# Patient Record
Sex: MALE | Race: WHITE | NOT HISPANIC OR LATINO | URBAN - METROPOLITAN AREA
[De-identification: names, ages, dates, MRNs, and addresses within clinical notes are randomized per-mention and may not be internally consistent; named-entity substitution may affect disease eponyms.]

---

## 2020-08-26 ENCOUNTER — APPOINTMENT (RX ONLY)
Dept: URBAN - METROPOLITAN AREA CLINIC 374 | Facility: CLINIC | Age: 40
Setting detail: DERMATOLOGY
End: 2020-08-26

## 2020-08-26 DIAGNOSIS — D22 MELANOCYTIC NEVI: ICD-10-CM

## 2020-08-26 DIAGNOSIS — Q828 OTHER SPECIFIED ANOMALIES OF SKIN: ICD-10-CM

## 2020-08-26 DIAGNOSIS — Q826 OTHER SPECIFIED ANOMALIES OF SKIN: ICD-10-CM

## 2020-08-26 DIAGNOSIS — L91.8 OTHER HYPERTROPHIC DISORDERS OF THE SKIN: ICD-10-CM

## 2020-08-26 DIAGNOSIS — Q819 OTHER SPECIFIED ANOMALIES OF SKIN: ICD-10-CM

## 2020-08-26 PROBLEM — D48.5 NEOPLASM OF UNCERTAIN BEHAVIOR OF SKIN: Status: ACTIVE | Noted: 2020-08-26

## 2020-08-26 PROBLEM — D22.4 MELANOCYTIC NEVI OF SCALP AND NECK: Status: ACTIVE | Noted: 2020-08-26

## 2020-08-26 PROBLEM — L85.8 OTHER SPECIFIED EPIDERMAL THICKENING: Status: ACTIVE | Noted: 2020-08-26

## 2020-08-26 PROCEDURE — ? BIOPSY BY SHAVE METHOD

## 2020-08-26 PROCEDURE — ? PHOTO-DOCUMENTATION

## 2020-08-26 PROCEDURE — ? COUNSELING

## 2020-08-26 PROCEDURE — 17110 DESTRUCTION B9 LES UP TO 14: CPT

## 2020-08-26 PROCEDURE — 11102 TANGNTL BX SKIN SINGLE LES: CPT | Mod: 59

## 2020-08-26 PROCEDURE — 99202 OFFICE O/P NEW SF 15 MIN: CPT | Mod: 25

## 2020-08-26 PROCEDURE — ? BENIGN DESTRUCTION

## 2020-08-26 PROCEDURE — ? PRESCRIPTION SAMPLES PROVIDED

## 2020-08-26 ASSESSMENT — LOCATION SIMPLE DESCRIPTION DERM
LOCATION SIMPLE: LEFT UPPER ARM
LOCATION SIMPLE: RIGHT ANTERIOR NECK
LOCATION SIMPLE: LEFT FOREARM
LOCATION SIMPLE: LEFT MEDIAL SUPERIOR TARSAL REGION
LOCATION SIMPLE: RIGHT UPPER ARM
LOCATION SIMPLE: RIGHT FOREARM

## 2020-08-26 ASSESSMENT — LOCATION DETAILED DESCRIPTION DERM
LOCATION DETAILED: RIGHT PROXIMAL POSTERIOR UPPER ARM
LOCATION DETAILED: LEFT DISTAL DORSAL FOREARM
LOCATION DETAILED: RIGHT PROXIMAL DORSAL FOREARM
LOCATION DETAILED: LEFT DISTAL POSTERIOR UPPER ARM
LOCATION DETAILED: RIGHT DISTAL DORSAL FOREARM
LOCATION DETAILED: LEFT PROXIMAL POSTERIOR UPPER ARM
LOCATION DETAILED: LEFT MEDIAL SUPERIOR TARSAL REGION
LOCATION DETAILED: LEFT PROXIMAL DORSAL FOREARM
LOCATION DETAILED: RIGHT DISTAL POSTERIOR UPPER ARM
LOCATION DETAILED: RIGHT CLAVICULAR NECK

## 2020-08-26 ASSESSMENT — LOCATION ZONE DERM
LOCATION ZONE: NECK
LOCATION ZONE: ARM
LOCATION ZONE: EYELID

## 2020-08-26 ASSESSMENT — SEVERITY ASSESSMENT: SEVERITY: MILD TO MODERATE

## 2020-08-26 NOTE — PROCEDURE: BENIGN DESTRUCTION
Medical Necessity Clause: This procedure was medically necessary because the lesions that were treated were:
Add 52 Modifier (Optional): no
Consent: The patient's consent was obtained including but not limited to risks of crusting, scabbing, blistering, scarring, darker or lighter pigmentary change, recurrence, incomplete removal and infection.
Post-Care Instructions: I reviewed with the patient in detail post-care instructions. Patient is to wear sunprotection, and avoid picking at any of the treated lesions. Pt may apply Vaseline to crusted or scabbing areas.
Medical Necessity Information: It is in your best interest to select a reason for this procedure from the list below. All of these items fulfill various CMS LCD requirements except the new and changing color options.
Anesthesia Volume In Cc: 0.5
Treatment Number (Will Not Render If 0): 1
Detail Level: Detailed

## 2020-08-26 NOTE — PROCEDURE: BIOPSY BY SHAVE METHOD

## 2020-09-23 ENCOUNTER — APPOINTMENT (RX ONLY)
Dept: URBAN - METROPOLITAN AREA CLINIC 374 | Facility: CLINIC | Age: 40
Setting detail: DERMATOLOGY
End: 2020-09-23

## 2020-09-23 DIAGNOSIS — Q819 OTHER SPECIFIED ANOMALIES OF SKIN: ICD-10-CM

## 2020-09-23 DIAGNOSIS — Q828 OTHER SPECIFIED ANOMALIES OF SKIN: ICD-10-CM

## 2020-09-23 DIAGNOSIS — Q826 OTHER SPECIFIED ANOMALIES OF SKIN: ICD-10-CM

## 2020-09-23 PROBLEM — D23.39 OTHER BENIGN NEOPLASM OF SKIN OF OTHER PARTS OF FACE: Status: ACTIVE | Noted: 2020-09-23

## 2020-09-23 PROBLEM — L85.8 OTHER SPECIFIED EPIDERMAL THICKENING: Status: ACTIVE | Noted: 2020-09-23

## 2020-09-23 PROCEDURE — ? PRESCRIPTION SAMPLES PROVIDED

## 2020-09-23 PROCEDURE — ? PATHOLOGY DISCUSSION

## 2020-09-23 PROCEDURE — 99213 OFFICE O/P EST LOW 20 MIN: CPT

## 2020-09-23 PROCEDURE — ? COUNSELING

## 2020-09-23 PROCEDURE — ? PRESCRIPTION MEDICATION MANAGEMENT

## 2020-09-23 PROCEDURE — ? PHOTO-DOCUMENTATION

## 2020-09-23 ASSESSMENT — LOCATION DETAILED DESCRIPTION DERM
LOCATION DETAILED: LEFT PROXIMAL POSTERIOR UPPER ARM
LOCATION DETAILED: RIGHT DISTAL POSTERIOR UPPER ARM
LOCATION DETAILED: LEFT PROXIMAL DORSAL FOREARM
LOCATION DETAILED: LEFT DISTAL POSTERIOR UPPER ARM
LOCATION DETAILED: RIGHT PROXIMAL DORSAL FOREARM
LOCATION DETAILED: RIGHT PROXIMAL POSTERIOR UPPER ARM
LOCATION DETAILED: RIGHT DISTAL DORSAL FOREARM
LOCATION DETAILED: LEFT DISTAL DORSAL FOREARM

## 2020-09-23 ASSESSMENT — LOCATION SIMPLE DESCRIPTION DERM
LOCATION SIMPLE: RIGHT FOREARM
LOCATION SIMPLE: LEFT UPPER ARM
LOCATION SIMPLE: RIGHT UPPER ARM
LOCATION SIMPLE: LEFT FOREARM

## 2020-09-23 ASSESSMENT — LOCATION ZONE DERM: LOCATION ZONE: ARM

## 2021-08-04 ENCOUNTER — HOSPITAL ENCOUNTER (EMERGENCY)
Facility: HOSPITAL | Age: 41
Discharge: HOME | End: 2021-08-04
Attending: EMERGENCY MEDICINE
Payer: COMMERCIAL

## 2021-08-04 VITALS
HEART RATE: 91 BPM | TEMPERATURE: 98.4 F | RESPIRATION RATE: 16 BRPM | DIASTOLIC BLOOD PRESSURE: 92 MMHG | SYSTOLIC BLOOD PRESSURE: 166 MMHG | OXYGEN SATURATION: 94 %

## 2021-08-04 DIAGNOSIS — L73.9 FOLLICULITIS: Primary | ICD-10-CM

## 2021-08-04 PROCEDURE — 63700000 HC SELF-ADMINISTRABLE DRUG: Performed by: PHYSICIAN ASSISTANT

## 2021-08-04 PROCEDURE — 99283 EMERGENCY DEPT VISIT LOW MDM: CPT

## 2021-08-04 RX ORDER — PERPHENAZINE 8 MG/1
8 TABLET ORAL
COMMUNITY
Start: 2021-07-14 | End: 2021-12-29

## 2021-08-04 RX ORDER — ATORVASTATIN CALCIUM 10 MG/1
10 TABLET, FILM COATED ORAL
COMMUNITY
Start: 2021-07-03 | End: 2021-12-29 | Stop reason: SDUPTHER

## 2021-08-04 RX ORDER — PERPHENAZINE 2 MG/1
2 TABLET ORAL
COMMUNITY
Start: 2021-05-31 | End: 2021-12-29

## 2021-08-04 RX ORDER — ESCITALOPRAM OXALATE 20 MG/1
15 TABLET ORAL DAILY
COMMUNITY
Start: 2021-05-03 | End: 2022-12-13 | Stop reason: ALTCHOICE

## 2021-08-04 RX ORDER — ARIPIPRAZOLE 30 MG/1
30 TABLET ORAL
COMMUNITY
Start: 2021-06-14 | End: 2021-12-29

## 2021-08-04 RX ORDER — SULFAMETHOXAZOLE AND TRIMETHOPRIM 800; 160 MG/1; MG/1
1 TABLET ORAL 2 TIMES DAILY
Qty: 14 TABLET | Refills: 0 | Status: SHIPPED | OUTPATIENT
Start: 2021-08-04 | End: 2021-08-11

## 2021-08-04 RX ORDER — PROPRANOLOL HYDROCHLORIDE 10 MG/1
10 TABLET ORAL
COMMUNITY
Start: 2021-07-03 | End: 2021-12-29 | Stop reason: SDUPTHER

## 2021-08-04 RX ORDER — PERPHENAZINE 4 MG/1
4 TABLET ORAL
COMMUNITY
Start: 2021-07-13 | End: 2021-12-29

## 2021-08-04 RX ORDER — SULFAMETHOXAZOLE AND TRIMETHOPRIM 800; 160 MG/1; MG/1
1 TABLET ORAL EVERY 12 HOURS
Status: DISCONTINUED | OUTPATIENT
Start: 2021-08-04 | End: 2021-08-05 | Stop reason: HOSPADM

## 2021-08-04 RX ADMIN — SULFAMETHOXAZOLE AND TRIMETHOPRIM 1 TABLET: 800; 160 TABLET ORAL at 22:48

## 2021-08-05 ASSESSMENT — ENCOUNTER SYMPTOMS
VOMITING: 0
CHILLS: 0
NAUSEA: 0
COLOR CHANGE: 1
DIAPHORESIS: 0
FEVER: 0

## 2021-08-05 NOTE — ED PROVIDER NOTES
Emergency Medicine Note  HPI   HISTORY OF PRESENT ILLNESS   41yM with PMH of MRSA, HTN,  shizophrenia, depression, presents to ED for evaluation due to concern for abscess/infected hair follicle on mons pubis. Pt reports over the last few days he has had increased redness and pain to the area, prompting him to come to ED for evaluation.    History provided by:  Patient   used: No    Abscess  Location:  Pelvis  Ano-genital abscess location: mons pubis.  Size:  3cm  Abscess quality: painful and redness    Abscess quality: not draining    Red streaking: no    Progression:  Worsening  Pain details:     Quality:  Aching    Severity:  Moderate    Timing:  Constant    Progression:  Worsening  Context: not injected drug use and not skin injury    Relieved by:  None tried  Worsened by:  Nothing  Associated symptoms: no fever, no nausea and no vomiting    Risk factors: hx of MRSA    Risk factors: no prior abscess          Patient History   PAST HISTORY     Reviewed from Nursing Triage:      Past Medical History:   Diagnosis Date   • Depression    • Hypertension    • Schizo affective schizophrenia (CMS/HCC)        History reviewed. No pertinent surgical history.    History reviewed. No pertinent family history.    Social History     Tobacco Use   • Smoking status: Never Smoker   Substance Use Topics   • Alcohol use: Never   • Drug use: Never         Review of Systems   REVIEW OF SYSTEMS     Review of Systems   Constitutional: Negative for chills, diaphoresis and fever.   Gastrointestinal: Negative for nausea and vomiting.   Skin: Positive for color change.         VITALS     ED Vitals    Date/Time Temp Pulse Resp BP SpO2 [facility]   08/04/21 2110 36.9 °C (98.4 °F) 91 16 166/92 94 % [facility]        Pulse Ox %: 94 % (08/04/21 2154)  Pulse Ox Interpretation: Normal (08/04/21 2154)           Physical Exam   PHYSICAL EXAM     Physical Exam  Vitals and nursing note reviewed.   Constitutional:       General: He is not in  acute distress.     Appearance: He is well-developed.   Cardiovascular:      Rate and Rhythm: Normal rate and regular rhythm.      Heart sounds: Normal heart sounds. No murmur heard.   No friction rub. No gallop.    Pulmonary:      Effort: Pulmonary effort is normal. No respiratory distress.      Breath sounds: Normal breath sounds.   Chest:      Chest wall: No tenderness.   Abdominal:      General: Bowel sounds are normal. There is no distension.      Palpations: Abdomen is soft.      Tenderness: There is no abdominal tenderness. There is no guarding or rebound.               PROCEDURES     Procedures     DATA     Results     None          Imaging Results    None         No orders to display       Scoring tools                                 ED Course & MDM   MDM / ED COURSE and CLINICAL IMPRESSIONS     MDM    ED Course as of Aug 05 1221   Wed Aug 04, 2021   2154 Early abscess versus folliculitis to mons pubis. No fluctuance or drainable collection.  Given h/o MRSA will cover with Bactrim DS. To begin warm compresses.  D/c home to f/u with PCP. Discussed return precautions.  Pt agreeable to plan.    [KL]      ED Course User Index  [KL] Sayra Adams PA C         Clinical Impressions as of Aug 05 1221   Folliculitis     Disposition: Discharge           Sayra Adams PA C  08/05/21 1221

## 2021-08-05 NOTE — ED ATTESTATION NOTE
Physician Attestation:     I personally saw and evaluated the patient, participated in the management, and agree with the findings in the above note except as where stated.  The Physician Assistant and I discussed  the case, workup, and disposition.      Chief Complaint  Chief Complaint   Patient presents with   • Abscess     infected hair follicle above genitals       41-year-old male presents for evaluation of abscess to his suprapubic region.  Has been there between 2 and 4 days.  No fevers.  History of MRSA in the past.  No drainage.    Exam performed with physician assistant  Small 2 cm indurated abscess very slightly erythematous with a central head that is not draining.  There is no fluctuance. No crepitus     Plan: Warm compresses and Bactrim        Leonard Wilde,   08/04/21 [id number]

## 2021-08-05 NOTE — DISCHARGE INSTRUCTIONS
Apply warm compresses to area hourly, which may help promote draining.   Begin taking Bactrim twice daily for one week.    Please return to the ED if you develop signs or symptoms of infection including, increased redness or red streaks around the wound, fever, chills, or any other concerning symptoms.

## 2021-12-29 ENCOUNTER — OFFICE VISIT (OUTPATIENT)
Dept: FAMILY MEDICINE | Facility: CLINIC | Age: 41
End: 2021-12-29
Payer: COMMERCIAL

## 2021-12-29 VITALS
SYSTOLIC BLOOD PRESSURE: 116 MMHG | HEIGHT: 74 IN | HEART RATE: 86 BPM | RESPIRATION RATE: 16 BRPM | BODY MASS INDEX: 34.19 KG/M2 | TEMPERATURE: 97.5 F | WEIGHT: 266.4 LBS | OXYGEN SATURATION: 94 % | DIASTOLIC BLOOD PRESSURE: 70 MMHG

## 2021-12-29 DIAGNOSIS — Z11.59 NEED FOR HEPATITIS C SCREENING TEST: ICD-10-CM

## 2021-12-29 DIAGNOSIS — Z11.4 SCREENING FOR HIV (HUMAN IMMUNODEFICIENCY VIRUS): ICD-10-CM

## 2021-12-29 DIAGNOSIS — E66.9 OBESITY (BMI 30-39.9): ICD-10-CM

## 2021-12-29 DIAGNOSIS — R68.82 DECREASED SEX DRIVE: Primary | ICD-10-CM

## 2021-12-29 PROCEDURE — 3008F BODY MASS INDEX DOCD: CPT

## 2021-12-29 PROCEDURE — 99203 OFFICE O/P NEW LOW 30 MIN: CPT | Mod: GC

## 2021-12-29 RX ORDER — OLANZAPINE 5 MG/1
1 TABLET ORAL NIGHTLY
COMMUNITY
Start: 2021-12-18 | End: 2022-12-13 | Stop reason: ALTCHOICE

## 2021-12-29 RX ORDER — PROPRANOLOL HYDROCHLORIDE 10 MG/1
10 TABLET ORAL
Qty: 30 TABLET | Refills: 3 | Status: SHIPPED | OUTPATIENT
Start: 2021-12-29 | End: 2022-05-03 | Stop reason: SDUPTHER

## 2021-12-29 RX ORDER — ATORVASTATIN CALCIUM 10 MG/1
10 TABLET, FILM COATED ORAL
Qty: 30 TABLET | Refills: 3 | Status: SHIPPED | OUTPATIENT
Start: 2021-12-29 | End: 2022-05-03 | Stop reason: SDUPTHER

## 2021-12-29 ASSESSMENT — PATIENT HEALTH QUESTIONNAIRE - PHQ9: SUM OF ALL RESPONSES TO PHQ9 QUESTIONS 1 & 2: 4

## 2021-12-29 NOTE — PATIENT INSTRUCTIONS
1) Please get you blood work done. Please fast for 8 hrs before hand. You can drink water and take medications normally. I will call you with the results    2) Please call to make an appointment for sleep study    Patient Education     Sleep Apnea  Sleep apnea is a condition in which breathing pauses or becomes shallow during sleep. Episodes of sleep apnea usually last 10 seconds or longer, and they may occur as many as 20 times an hour. Sleep apnea disrupts your sleep and keeps your body from getting the rest that it needs. This condition can increase your risk of certain health problems, including:  · Heart attack.  · Stroke.  · Obesity.  · Diabetes.  · Heart failure.  · Irregular heartbeat.  What are the causes?  There are three kinds of sleep apnea:  · Obstructive sleep apnea. This kind is caused by a blocked or collapsed airway.  · Central sleep apnea. This kind happens when the part of the brain that controls breathing does not send the correct signals to the muscles that control breathing.  · Mixed sleep apnea. This is a combination of obstructive and central sleep apnea.  The most common cause of this condition is a collapsed or blocked airway. An airway can collapse or become blocked if:  · Your throat muscles are abnormally relaxed.  · Your tongue and tonsils are larger than normal.  · You are overweight.  · Your airway is smaller than normal.  What increases the risk?  You are more likely to develop this condition if you:  · Are overweight.  · Smoke.  · Have a smaller than normal airway.  · Are elderly.  · Are male.  · Drink alcohol.  · Take sedatives or tranquilizers.  · Have a family history of sleep apnea.  What are the signs or symptoms?  Symptoms of this condition include:  · Trouble staying asleep.  · Daytime sleepiness and tiredness.  · Irritability.  · Loud snoring.  · Morning headaches.  · Trouble concentrating.  · Forgetfulness.  · Decreased interest in sex.  · Unexplained sleepiness.  · Mood  swings.  · Personality changes.  · Feelings of depression.  · Waking up often during the night to urinate.  · Dry mouth.  · Sore throat.  How is this diagnosed?  This condition may be diagnosed with:  · A medical history.  · A physical exam.  · A series of tests that are done while you are sleeping (sleep study). These tests are usually done in a sleep lab, but they may also be done at home.  How is this treated?  Treatment for this condition aims to restore normal breathing and to ease symptoms during sleep. It may involve managing health issues that can affect breathing, such as high blood pressure or obesity. Treatment may include:  · Sleeping on your side.  · Using a decongestant if you have nasal congestion.  · Avoiding the use of depressants, including alcohol, sedatives, and narcotics.  · Losing weight if you are overweight.  · Making changes to your diet.  · Quitting smoking.  · Using a device to open your airway while you sleep, such as:  ? An oral appliance. This is a custom-made mouthpiece that shifts your lower jaw forward.  ? A continuous positive airway pressure (CPAP) device. This device blows air through a mask when you breathe out (exhale).  ? A nasal expiratory positive airway pressure (EPAP) device. This device has valves that you put into each nostril.  ? A bi-level positive airway pressure (BPAP) device. This device blows air through a mask when you breathe in (inhale) and breathe out (exhale).  · Having surgery if other treatments do not work. During surgery, excess tissue is removed to create a wider airway.  It is important to get treatment for sleep apnea. Without treatment, this condition can lead to:  · High blood pressure.  · Coronary artery disease.  · In men, an inability to achieve or maintain an erection (impotence).  · Reduced thinking abilities.  Follow these instructions at home:  Lifestyle  · Make any lifestyle changes that your health care provider recommends.  · Eat a healthy,  well-balanced diet.  · Take steps to lose weight if you are overweight.  · Avoid using depressants, including alcohol, sedatives, and narcotics.  · Do not use any products that contain nicotine or tobacco, such as cigarettes, e-cigarettes, and chewing tobacco. If you need help quitting, ask your health care provider.  General instructions  · Take over-the-counter and prescription medicines only as told by your health care provider.  · If you were given a device to open your airway while you sleep, use it only as told by your health care provider.  · If you are having surgery, make sure to tell your health care provider you have sleep apnea. You may need to bring your device with you.  · Keep all follow-up visits as told by your health care provider. This is important.  Contact a health care provider if:  · The device that you received to open your airway during sleep is uncomfortable or does not seem to be working.  · Your symptoms do not improve.  · Your symptoms get worse.  Get help right away if:  · You develop:  ? Chest pain.  ? Shortness of breath.  ? Discomfort in your back, arms, or stomach.  · You have:  ? Trouble speaking.  ? Weakness on one side of your body.  ? Drooping in your face.  These symptoms may represent a serious problem that is an emergency. Do not wait to see if the symptoms will go away. Get medical help right away. Call your local emergency services (911 in the [place] Do not drive yourself to the hospital.  Summary  · Sleep apnea is a condition in which breathing pauses or becomes shallow during sleep.  · The most common cause is a collapsed or blocked airway.  · The goal of treatment is to restore normal breathing and to ease symptoms during sleep.  This information is not intended to replace advice given to you by your health care provider. Make sure you discuss any questions you have with your health care provider.  Document Revised: 06/03/2020 Document Reviewed: 08/13/2019  Rosina  Patient Education © 2021 [employer]

## 2021-12-29 NOTE — ASSESSMENT & PLAN NOTE
BMI 34  Daytime fatigue low sex drive possibly 2/2 sleep apnea  MP score 4    - send for sleep study  - f/u fasting BMP, CBC, FLP, TSH

## 2021-12-29 NOTE — PROGRESS NOTES
Subjective      Patient ID: Rodolfo Stafford is a 41 y.o. male here for the following:   Low Testosterone    HPI  Takes propanolol for occasional palpitations. Used to see [facility] family medicine, Dr. Juan R Winkler, talkspace. 1 month ago. Recommended testosterone testing. Was on Vraylar which caused visceral pain and dec sex drive, stopped 2 weeks ago (took for 1 wk). Visceral pain has resolved. Sex drive is a little better    But has overall been decreased since starting psych medications in 2013.   Still has nocturnal erections, no hair loss, or difficulty growing facial hair    Feels tired/fatigued all the time. Works 30 hr/wk + standing. Has difficulty falling asleep and waking up in the work, late to work on occasion. Occassionally  Goes to bed 10pm-2am wakes at 8-9am. Has been told he snores    PHQ9: 4    The following have been reviewed and updated as appropriate in this visit:  Allergies  Meds  Problems  Social History  Last Labs    No Known Allergies  Current Outpatient Medications   Medication Sig Dispense Refill   • atorvastatin 10 mg tablet Take 1 tablet (10 mg total) by mouth once daily. 30 tablet 3   • escitalopram (LEXAPRO) 20 mg tablet TAKE ONE AND ONE HALF (1.5) TABLETS BY MOUTH EVERY MORNING     • OLANZapine 5 mg tablet Take 1 tablet by mouth nightly.     • propranoloL 10 mg tablet Take 1 tablet (10 mg total) by mouth once daily. 30 tablet 3   • ARIPiprazole (ABILIFY) 30 mg tablet Take 30 mg by mouth once daily.       No current facility-administered medications for this visit.     Past Medical History:   Diagnosis Date   • Anxiety    • Depression    • Schizophrenia (CMS/HCC)      Past Surgical History:   Procedure Laterality Date   • TONSILLECTOMY     • WISDOM TOOTH EXTRACTION  2012    All 4     Family History   Problem Relation Age of Onset   • Hyperlipidemia Biological Mother    • Stroke Biological Mother    • Depression Biological Mother    • Suicide Attempts Biological Mother    •  Asthma Biological Mother    • Arthritis Biological Mother      Social History     Socioeconomic History   • Marital status: Single     Spouse name: Not on file   • Number of children: Not on file   • Years of education: Not on file   • Highest education level: Not on file   Occupational History   • Occupation: - [employer]   Tobacco Use   • Smoking status: Never Smoker   • Smokeless tobacco: Never Used   Vaping Use   • Vaping Use: Never used   Substance and Sexual Activity   • Alcohol use: Never   • Drug use: Never   • Sexual activity: Not Currently     Partners: Female     Birth control/protection: Condom   Other Topics Concern   • Not on file   Social History Narrative   • Not on file     Social Determinants of Health     Financial Resource Strain: Not on file   Food Insecurity: Not on file   Transportation Needs: Not on file   Physical Activity: Not on file   Stress: Not on file   Social Connections: Not on file   Intimate Partner Violence: Not on file   Housing Stability: Not on file       Screenings  Hep C screening: ordered  HIV screen: ordered     Vaccines     Influenza: UTD    Tdap: UTD    COVID: UTD and boosted    Physical Exam  Vitals reviewed. Exam conducted with a chaperone present.   Constitutional:       General: He is not in acute distress.  HENT:      Right Ear: External ear normal.      Left Ear: External ear normal.      Mouth/Throat:      Mouth: Mucous membranes are moist. No oral lesions.      Tongue: No lesions.      Comments: MP 4  Eyes:      General: No scleral icterus.     Extraocular Movements: Extraocular movements intact.      Conjunctiva/sclera: Conjunctivae normal.      Pupils: Pupils are equal, round, and reactive to light.   Cardiovascular:      Rate and Rhythm: Normal rate and regular rhythm.      Heart sounds: Normal heart sounds.   Pulmonary:      Effort: Pulmonary effort is normal.      Breath sounds: Normal breath sounds.   Abdominal:      General: There is no distension.       Palpations: Abdomen is soft.      Tenderness: There is no abdominal tenderness.   Genitourinary:     Pubic Area: No rash.       Penis: Normal and uncircumcised.       Testes: Normal.         Right: Mass not present.         Left: Mass not present.   Musculoskeletal:         General: No swelling or deformity.      Cervical back: Normal range of motion.      Right lower leg: No edema.      Left lower leg: No edema.   Skin:     General: Skin is warm and dry.   Neurological:      Mental Status: He is alert and oriented to person, place, and time.         ASSESSMENT & PLAN    Problem List Items Addressed This Visit        Endocrine/Metabolic    Obesity (BMI 30-39.9)     BMI 34  Daytime fatigue low sex drive possibly 2/2 sleep apnea  MP score 4    - send for sleep study  - f/u fasting BMP, CBC, FLP, TSH         Relevant Orders    Basic metabolic panel    CBC and differential    TSH w reflex FT4    Lipid panel    Polysomnogram (Sleep Study)       Other    Decreased sex drive - Primary     Psychiatrist recommended testosterone testing for low sex drive.  No issues with baldness or difficulty growing facial hair  No testicular atrophy present on exam  Possibly 2/2 to fatigue    - f/u fasting testosterone level         Relevant Orders    Polysomnogram (Sleep Study)    Testosterone, free, total    Need for hepatitis C screening test     F/u results         Relevant Orders    Hepatitis C antibody    Screening for HIV (human immunodeficiency virus)     F/u results         Relevant Orders    HIV 1,2 AB P24 AG          Orders Placed This Encounter   Procedures   • HIV 1,2 AB P24 AG     Standing Status:   Future     Number of Occurrences:   1     Standing Expiration Date:   12/29/2022     Order Specific Question:   Release to patient     Answer:   Manual release only   • Hepatitis C antibody     Standing Status:   Future     Number of Occurrences:   1     Standing Expiration Date:   12/29/2022     Order Specific Question:    Release to patient     Answer:   Immediate   • Basic metabolic panel     Standing Status:   Future     Number of Occurrences:   1     Standing Expiration Date:   12/29/2022     Order Specific Question:   Release to patient     Answer:   Immediate   • CBC and differential     Standing Status:   Future     Number of Occurrences:   1     Standing Expiration Date:   12/29/2022     Order Specific Question:   Release to patient     Answer:   Immediate   • TSH w reflex FT4     Standing Status:   Future     Number of Occurrences:   1     Standing Expiration Date:   12/29/2022     Order Specific Question:   Release to patient     Answer:   Immediate   • Lipid panel     Standing Status:   Future     Number of Occurrences:   1     Standing Expiration Date:   12/29/2022     Order Specific Question:   Release to patient     Answer:   Immediate   • Testosterone, free, total     Standing Status:   Future     Number of Occurrences:   1     Standing Expiration Date:   12/29/2022     Order Specific Question:   Release to patient     Answer:   Immediate   • Polysomnogram (Sleep Study)     Standing Status:   Future     Standing Expiration Date:   12/29/2022     Order Specific Question:   Study to be done at normal sleep time?     Answer:   Yes     Order Specific Question:   Where would you like to schedule this?     Answer:   [facility]           _____________________  Maggie Mancini,   12/29/21

## 2021-12-29 NOTE — ASSESSMENT & PLAN NOTE
Psychiatrist recommended testosterone testing for low sex drive.  No issues with baldness or difficulty growing facial hair  No testicular atrophy present on exam  Possibly 2/2 to fatigue    - f/u fasting testosterone level

## 2021-12-30 NOTE — PROGRESS NOTES
I reviewed the history and physical examination of the patient and discussed the management with the Resident. I reviewed the Resident's note and agree with the documented findings and plan of care, except for my comments below or within the additional notes today.  Preceptor Note  I met the patient and reviewed the history with the resident.  Patient presents for general medical eval.  Under psychiatric care, on olanzapine, aripiprazole, escitalopram, and propranolol prn.  He notes decreased libido and somewhat decreased erectile function.  His psychiatrist suggested getting a testosterone level. He also notes snoring at night.   Exam: He is WDWN in NAD Obese.  Mallampati score 4  Chest clear Heart RRR, no murmur.   normal.  No edema No gross neuro deficit. Affect normal.   Assessment and Plan: All of his psych meds are known to cause sexual dysfunction, and in combination they are much more likely to.  Doubt hypogonadism.  Consider hyperprolactinemia due to antipsychotic meds. Check for gynecomastia at next visit.    I agree with Dr Mancini's assessment and plan.

## 2022-01-14 LAB
BASOPHILS # BLD AUTO: 0.1 X10E3/UL (ref 0–0.2)
BASOPHILS NFR BLD AUTO: 1 %
BUN SERPL-MCNC: 8 MG/DL (ref 6–20)
BUN/CREAT SERPL: 10 (ref 9–20)
CALCIUM SERPL-MCNC: 9.4 MG/DL (ref 8.7–10.2)
CHLORIDE SERPL-SCNC: 103 MMOL/L (ref 96–106)
CHOLEST SERPL-MCNC: 167 MG/DL (ref 100–199)
CO2 SERPL-SCNC: 24 MMOL/L (ref 20–29)
CREAT SERPL-MCNC: 0.78 MG/DL (ref 0.76–1.27)
EOSINOPHIL # BLD AUTO: 0.2 X10E3/UL (ref 0–0.4)
EOSINOPHIL NFR BLD AUTO: 2 %
ERYTHROCYTE [DISTWIDTH] IN BLOOD BY AUTOMATED COUNT: 12.5 % (ref 11.6–15.4)
GLUCOSE SERPL-MCNC: 105 MG/DL (ref 65–99)
HCT VFR BLD AUTO: 44.3 % (ref 37.5–51)
HCV AB S/CO SERPL IA: <0.1 S/CO RATIO (ref 0–0.9)
HDLC SERPL-MCNC: 38 MG/DL
HGB BLD-MCNC: 15.4 G/DL (ref 13–17.7)
HIV 1+2 AB+HIV1 P24 AG SERPL QL IA: NON REACTIVE
IMM GRANULOCYTES # BLD AUTO: 0.1 X10E3/UL (ref 0–0.1)
IMM GRANULOCYTES NFR BLD AUTO: 1 %
LAB CORP EGFR IF AFRICN AM: 131 ML/MIN/1.73
LAB CORP EGFR IF NONAFRICN AM: 114 ML/MIN/1.73
LDLC SERPL CALC-MCNC: 106 MG/DL (ref 0–99)
LYMPHOCYTES # BLD AUTO: 3.4 X10E3/UL (ref 0.7–3.1)
LYMPHOCYTES NFR BLD AUTO: 34 %
MCH RBC QN AUTO: 29.7 PG (ref 26.6–33)
MCHC RBC AUTO-ENTMCNC: 34.8 G/DL (ref 31.5–35.7)
MCV RBC AUTO: 85 FL (ref 79–97)
MONOCYTES # BLD AUTO: 0.9 X10E3/UL (ref 0.1–0.9)
MONOCYTES NFR BLD AUTO: 9 %
NEUTROPHILS # BLD AUTO: 5.3 X10E3/UL (ref 1.4–7)
NEUTROPHILS NFR BLD AUTO: 53 %
PLATELET # BLD AUTO: 321 X10E3/UL (ref 150–450)
POTASSIUM SERPL-SCNC: 4.6 MMOL/L (ref 3.5–5.2)
RBC # BLD AUTO: 5.19 X10E6/UL (ref 4.14–5.8)
SODIUM SERPL-SCNC: 140 MMOL/L (ref 134–144)
T4 FREE SERPL-MCNC: 1.16 NG/DL (ref 0.82–1.77)
TRIGL SERPL-MCNC: 128 MG/DL (ref 0–149)
TSH SERPL DL<=0.005 MIU/L-ACNC: 1 UIU/ML (ref 0.45–4.5)
VLDLC SERPL CALC-MCNC: 23 MG/DL (ref 5–40)
WBC # BLD AUTO: 10 X10E3/UL (ref 3.4–10.8)

## 2022-01-15 LAB
TESTOST FREE SERPL-MCNC: 12.6 PG/ML (ref 8.7–25.1)
TESTOST SERPL-MCNC: 344 NG/DL (ref 264–916)

## 2022-05-03 ENCOUNTER — OFFICE VISIT (OUTPATIENT)
Dept: FAMILY MEDICINE | Facility: CLINIC | Age: 42
End: 2022-05-03
Payer: COMMERCIAL

## 2022-05-03 VITALS
WEIGHT: 274.2 LBS | SYSTOLIC BLOOD PRESSURE: 120 MMHG | BODY MASS INDEX: 35.19 KG/M2 | RESPIRATION RATE: 16 BRPM | HEART RATE: 92 BPM | DIASTOLIC BLOOD PRESSURE: 82 MMHG | TEMPERATURE: 96.8 F | HEIGHT: 74 IN | OXYGEN SATURATION: 96 %

## 2022-05-03 DIAGNOSIS — H93.8X3 SENSATION OF FULLNESS IN BOTH EARS: Primary | ICD-10-CM

## 2022-05-03 DIAGNOSIS — F20.9 CHRONIC SCHIZOPHRENIA (CMS/HCC): ICD-10-CM

## 2022-05-03 DIAGNOSIS — E78.5 DYSLIPIDEMIA: ICD-10-CM

## 2022-05-03 PROBLEM — F41.9 ANXIETY AND DEPRESSION: Status: ACTIVE | Noted: 2022-05-03

## 2022-05-03 PROBLEM — F32.A ANXIETY AND DEPRESSION: Status: ACTIVE | Noted: 2022-05-03

## 2022-05-03 PROBLEM — Z11.59 NEED FOR HEPATITIS C SCREENING TEST: Status: RESOLVED | Noted: 2021-12-29 | Resolved: 2022-05-03

## 2022-05-03 PROBLEM — Z11.4 SCREENING FOR HIV (HUMAN IMMUNODEFICIENCY VIRUS): Status: RESOLVED | Noted: 2021-12-29 | Resolved: 2022-05-03

## 2022-05-03 PROCEDURE — 3008F BODY MASS INDEX DOCD: CPT | Performed by: STUDENT IN AN ORGANIZED HEALTH CARE EDUCATION/TRAINING PROGRAM

## 2022-05-03 PROCEDURE — 99213 OFFICE O/P EST LOW 20 MIN: CPT | Mod: GE | Performed by: STUDENT IN AN ORGANIZED HEALTH CARE EDUCATION/TRAINING PROGRAM

## 2022-05-03 RX ORDER — FLUTICASONE PROPIONATE 50 MCG
1 SPRAY, SUSPENSION (ML) NASAL DAILY
Qty: 16 G | Refills: 5 | Status: SHIPPED | OUTPATIENT
Start: 2022-05-03 | End: 2022-12-13

## 2022-05-03 RX ORDER — PROPRANOLOL HYDROCHLORIDE 10 MG/1
10 TABLET ORAL
Qty: 90 TABLET | Refills: 1 | Status: SHIPPED | OUTPATIENT
Start: 2022-05-03 | End: 2022-09-14 | Stop reason: SDUPTHER

## 2022-05-03 RX ORDER — ATORVASTATIN CALCIUM 10 MG/1
10 TABLET, FILM COATED ORAL
Qty: 90 TABLET | Refills: 1 | Status: SHIPPED | OUTPATIENT
Start: 2022-05-03 | End: 2022-09-14 | Stop reason: SDUPTHER

## 2022-05-03 NOTE — ASSESSMENT & PLAN NOTE
Patient presents for several months of fullness in both ears.  Unremarkable HEENT exam today.  Suspect there could be component of eustachian tube dysfunction which we will do a trial of Flonase for.  Patient to follow-up as needed if new symptoms or worsening symptoms.  Will consider ENT referral at that time.

## 2022-05-03 NOTE — PROGRESS NOTES
[facility]    [address]  [place] [address]  Tel: [phone]   Fax: [phone]     History of Present Illness     Subjective     Patient ID: Rodolfo Stafford is a 42 y.o. male.    HPI     He c/o fullness of bilateral ears  Has been going on for few months  Used to play trumpet not sure it that affected the ear pressure  He has to pop his ears to help with the fullness  No drainage. No fever  No vertigo or dizziness or tinnitus  No hearing loss  Has hx of ear wax  Uses qtip- try to use safety ones  Follows with psych for schizoprenia. Maintained on olanzapine    Patient reports seasonal allergies worse in the spring.  He states symptoms currently under control.  He is no taking antihistamines currently.     Past Medical/Surgical/Family/Social History       The following have been reviewed and updated as appropriate in this visit:   Tobacco  Allergies  Meds  Problems  Med Hx  Surg Hx  Fam Hx           Past Medical History:   Diagnosis Date   • Anxiety    • Depression    • Schizophrenia (CMS/[facility])        Past Surgical History:   Procedure Laterality Date   • TONSILLECTOMY     • WISDOM TOOTH EXTRACTION  2012    All 4       Family History   Problem Relation Age of Onset   • Hyperlipidemia Biological Mother    • Stroke Biological Mother    • Depression Biological Mother    • Suicide Attempts Biological Mother    • Asthma Biological Mother    • Arthritis Biological Mother        Social History     Tobacco Use   • Smoking status: Never Smoker   • Smokeless tobacco: Never Used   Vaping Use   • Vaping Use: Never used   Substance Use Topics   • Alcohol use: Never   • Drug use: Never       Patient Care Team:  Maggie Mancini DO as PCP - General (Family Medicine)       Allergies and Medications       No Known Allergies      Current Outpatient Medications   Medication Sig Dispense Refill   • atorvastatin (LIPITOR) 10 mg tablet Take 1 tablet (10 mg total) by mouth once daily. 90  tablet 1   • escitalopram (LEXAPRO) 20 mg tablet Take 15 mg by mouth daily.     • fluticasone propionate (FLONASE) 50 mcg/actuation nasal spray Administer 1 spray into each nostril daily. 16 g 5   • OLANZapine 5 mg tablet Take 1 tablet by mouth nightly.     • propranoloL (INDERAL) 10 mg tablet Take 1 tablet (10 mg total) by mouth once daily. 90 tablet 1     No current facility-administered medications for this visit.          Review of Systems       Review of Systems  Per HPI   Physical Examination       Objective     Vitals:    05/03/22 0907   BP: 120/82   Pulse: 92   Resp: 16   Temp: (!) 36 °C (96.8 °F)   SpO2: 96%       BP Readings from Last 3 Encounters:   05/03/22 120/82   12/29/21 116/70   08/04/21 (!) 166/92     Wt Readings from Last 3 Encounters:   05/03/22 124 kg (274 lb 3.2 oz)   12/29/21 121 kg (266 lb 6.4 oz)        Physical Exam  Vitals reviewed.   Constitutional:       General: He is not in acute distress.     Appearance: Normal appearance. He is well-developed and well-groomed.   HENT:      Head: Normocephalic and atraumatic.      Right Ear: Hearing, tympanic membrane, ear canal and external ear normal. No decreased hearing noted. No laceration.      Left Ear: Hearing, tympanic membrane, ear canal and external ear normal. No decreased hearing noted. No laceration.      Nose: Nose normal. No congestion.      Mouth/Throat:      Mouth: Mucous membranes are moist.      Pharynx: Oropharynx is clear. No oropharyngeal exudate or posterior oropharyngeal erythema.      Tonsils: No tonsillar exudate or tonsillar abscesses.   Eyes:      General: Lids are normal. No scleral icterus.        Right eye: No discharge.         Left eye: No discharge.      Extraocular Movements: Extraocular movements intact.      Conjunctiva/sclera: Conjunctivae normal.      Pupils: Pupils are equal, round, and reactive to light.   Cardiovascular:      Rate and Rhythm: Normal rate and regular rhythm.      Heart sounds: Normal heart  sounds and S2 normal. Heart sounds not distant. No murmur heard.    No gallop.   Pulmonary:      Effort: Pulmonary effort is normal. No accessory muscle usage or respiratory distress.      Breath sounds: Normal breath sounds and air entry.   Musculoskeletal:         General: Normal range of motion.      Cervical back: Normal range of motion and neck supple.   Skin:     General: Skin is warm and dry.   Neurological:      General: No focal deficit present.      Mental Status: He is alert. Mental status is at baseline.   Psychiatric:         Attention and Perception: Attention normal.         Mood and Affect: Mood normal.         Speech: Speech normal.         Behavior: Behavior normal. Behavior is cooperative.          Laboratory Results     Lab Results   Component Value Date    WBC 10.0 01/13/2022    HGB 15.4 01/13/2022     01/13/2022    CHOL 167 01/13/2022    TRIG 128 01/13/2022    HDL 38 (L) 01/13/2022     01/13/2022    K 4.6 01/13/2022    CREATININE 0.78 01/13/2022    BUN 8 01/13/2022    CO2 24 01/13/2022    TSH 1.000 01/13/2022        The 10-year ASCVD risk score (Branandreea BARONE Jr., et al., 2013) is: 1%    Values used to calculate the score:      Age: 42 years      Sex: Male      Is Non- : No      Diabetic: No      Tobacco smoker: No      Systolic Blood Pressure: 120 mmHg      Is BP treated: No      HDL Cholesterol: 38 mg/dL      Total Cholesterol: 167 mg/dL    Immunization History   Administered Date(s) Administered   • Sars-cov-2 (Covid-19) Vaccine, Pfizer 03/26/2021, 04/23/2021, 12/17/2021       There are no preventive care reminders to display for this patient.       Assessment and Plan       Diagnoses and all orders for this visit:    Sensation of fullness in both ears (Primary)  Assessment & Plan:  Patient presents for several months of fullness in both ears.  Unremarkable HEENT exam today.  Suspect there could be component of eustachian tube dysfunction which we will do a  trial of Flonase for.  Patient to follow-up as needed if new symptoms or worsening symptoms.  Will consider ENT referral at that time.    Orders:  -     fluticasone propionate (FLONASE) 50 mcg/actuation nasal spray; Administer 1 spray into each nostril daily.    Dyslipidemia  Comments:  Stable.  Continue Lipitor  Orders:  -     atorvastatin (LIPITOR) 10 mg tablet; Take 1 tablet (10 mg total) by mouth once daily.    Chronic schizophrenia (CMS/HCC)  Comments:  Stable.  Follows with psychiatry.  Continue olanzapine.    Other orders  -     propranoloL (INDERAL) 10 mg tablet; Take 1 tablet (10 mg total) by mouth once daily.      Return if symptoms worsen or fail to improve.             Vinnie Ojeda MD    5/3/2022    Disclaimer: This document was generated utilizing voice recognition technology, typographical errors may be present.

## 2022-05-17 ENCOUNTER — TELEPHONE (OUTPATIENT)
Dept: FAMILY MEDICINE | Facility: CLINIC | Age: 42
End: 2022-05-17
Payer: COMMERCIAL

## 2022-05-17 DIAGNOSIS — K75.81 NASH (NONALCOHOLIC STEATOHEPATITIS): Primary | ICD-10-CM

## 2022-05-17 NOTE — TELEPHONE ENCOUNTER
Pt. Asking for a call back w/ the recommendation of a -hepatologist pcp recommend for pt. To continue w/ care for fatty liver. He can be reached at [phone].

## 2022-05-18 NOTE — TELEPHONE ENCOUNTER
Called - given name and number for Dr. SrivastavaGI for eval of OLVERA (documented hx from [facility] on chart review). Pt would prefer to stay in [place]    Referral ordered

## 2022-07-25 ENCOUNTER — TELEPHONE (OUTPATIENT)
Dept: FAMILY MEDICINE | Facility: CLINIC | Age: 42
End: 2022-07-25

## 2022-07-25 ENCOUNTER — OFFICE VISIT (OUTPATIENT)
Dept: FAMILY MEDICINE | Facility: CLINIC | Age: 42
End: 2022-07-25
Payer: COMMERCIAL

## 2022-07-25 VITALS — BODY MASS INDEX: 36.19 KG/M2 | RESPIRATION RATE: 20 BRPM | WEIGHT: 282 LBS | HEIGHT: 74 IN | TEMPERATURE: 101.7 F

## 2022-07-25 DIAGNOSIS — J02.9 SORE THROAT: Primary | ICD-10-CM

## 2022-07-25 LAB
EXPIRATION DATE: NORMAL
FLUAV RNA SPEC QL NAA+PROBE: NEGATIVE
FLUBV RNA SPEC QL NAA+PROBE: NEGATIVE
Lab: NORMAL
POCT MANUFACTURER: NORMAL
RSV RNA SPEC QL NAA+PROBE: NEGATIVE
S PYO AG THROAT QL: NEGATIVE
SARS-COV-2 RNA RESP QL NAA+PROBE: POSITIVE

## 2022-07-25 PROCEDURE — 99213 OFFICE O/P EST LOW 20 MIN: CPT | Mod: GC

## 2022-07-25 PROCEDURE — 87637 SARSCOV2&INF A&B&RSV AMP PRB: CPT

## 2022-07-25 PROCEDURE — 3008F BODY MASS INDEX DOCD: CPT

## 2022-07-25 PROCEDURE — 87880 STREP A ASSAY W/OPTIC: CPT

## 2022-07-25 NOTE — PROGRESS NOTES
"s  Subjective      Patient ID: Rodolfo Stafford is a 42 y.o. male here for the following:   Cough    HPI  NP cough started around 9am yesterday  Then started to feel week and fatigued with sore throat. Home COVID test neg. Trying mucinex and allegra for sx management. Less appetite. Hurts to eat and drink but maintaining good PO intake    Temp this AM was 99.4 at home. Today >101F  Feels feverish  Little bit of runny nose    Denies CP, SOB, N/V/D, abd pain, recent sick contacts, exposure to children    The following have been reviewed and updated as appropriate in this visit:  Allergies  Meds  Problems  Social History  Patient Active Problem List   Diagnosis   • Decreased sex drive   • Obesity (BMI 30-39.9)   • Chronic schizophrenia (CMS/HCC)   • Anxiety and depression   • Dyslipidemia   • Sensation of fullness in both ears   • Sore throat       Social History     Tobacco Use   • Smoking status: Never Smoker   • Smokeless tobacco: Never Used   Vaping Use   • Vaping Use: Never used   Substance Use Topics   • Alcohol use: Never   • Drug use: Never       Allergies as of 07/25/2022   • (No Known Allergies)         Current Outpatient Medications:   •  atorvastatin (LIPITOR) 10 mg tablet, Take 1 tablet (10 mg total) by mouth once daily., Disp: 90 tablet, Rfl: 1  •  escitalopram (LEXAPRO) 20 mg tablet, Take 15 mg by mouth daily., Disp: , Rfl:   •  fluticasone propionate (FLONASE) 50 mcg/actuation nasal spray, Administer 1 spray into each nostril daily., Disp: 16 g, Rfl: 5  •  OLANZapine 5 mg tablet, Take 1 tablet by mouth nightly., Disp: , Rfl:   •  propranoloL (INDERAL) 10 mg tablet, Take 1 tablet (10 mg total) by mouth once daily., Disp: 90 tablet, Rfl: 1      Review of systems as per HPI and below  Review of Systems  Objective   Vitals:   Vitals:    07/25/22 1113   Resp: 20   Temp: (!) 38.7 °C (101.7 °F)   Weight: 128 kg (282 lb)   Height: 1.88 m (6' 2\")     Body mass index is 36.21 kg/m².    Physical Exam  Vitals " reviewed.   Constitutional:       General: He is not in acute distress.  HENT:      Right Ear: External ear normal.      Left Ear: External ear normal.      Mouth/Throat:      Mouth: Mucous membranes are moist.      Pharynx: No oropharyngeal exudate or posterior oropharyngeal erythema.   Eyes:      General: No scleral icterus.     Extraocular Movements: Extraocular movements intact.      Conjunctiva/sclera: Conjunctivae normal.      Pupils: Pupils are equal, round, and reactive to light.   Cardiovascular:      Rate and Rhythm: Normal rate and regular rhythm.      Heart sounds: Normal heart sounds.   Pulmonary:      Effort: Pulmonary effort is normal.      Breath sounds: Rhonchi present.      Comments: Mild rhonchi b/l  Abdominal:      General: There is no distension.      Palpations: Abdomen is soft.      Tenderness: There is no abdominal tenderness.   Musculoskeletal:         General: No swelling or deformity.      Cervical back: Normal range of motion.      Right lower leg: No edema.      Left lower leg: No edema.   Skin:     General: Skin is warm and dry.   Neurological:      Mental Status: He is alert and oriented to person, place, and time.       ASSESSMENT & PLAN    Problem List Items Addressed This Visit        Nervous    Sore throat - Primary     Fever, sore throat and cough that began ~24hrs ago  Flu + rapid strep neg   Benign physical exam    - f/u monospot, EBV panel, COVID  - focus on symptom management  - advised to call office if fever>103F, worsening sx           Relevant Orders    POCT rapid strep A    Mono Spot    COVID- 19 PCR Symptomatic (includes FLU A/B & RSV) - [facility]    Elsie-Barr virus VCA antibody panel    Throat culture, comprehensive          Orders Placed This Encounter   Procedures   • COVID- 19 PCR Symptomatic (includes FLU A/B & RSV) - [facility]     Standing Status:   Future     Number of Occurrences:   1     Standing Expiration Date:   7/25/2023     Order Specific Question:   Reason  "for testing:     Answer:   Symptomatic     Order Specific Question:   COVID-19 PUI? (Always yuliya \"yes\" regardless of resulting lab)     Answer:   Yes     Order Specific Question:   Symptomatic for COVID-19 as defined by CDC?     Answer:   Yes     Order Specific Question:   Date of Symptom Onset     Answer:   7/24/2022     Order Specific Question:   Hospitalized for COVID-19?     Answer:   No     Order Specific Question:   Admitted to ICU for COVID-19?     Answer:   No     Order Specific Question:   Employed in healthcare setting?     Answer:   No     Order Specific Question:   Resident in a congregate (group) care setting?     Answer:   No     Order Specific Question:   Release to patient     Answer:   Immediate     Order Specific Question:   Source of Specimen(s)     Answer:   Nose [27]   • Throat culture, comprehensive     Standing Status:   Future     Standing Expiration Date:   7/25/2023     Order Specific Question:   Specimen Source?     Answer:   throat     Order Specific Question:   Release to patient     Answer:   Immediate   • Mono Spot     Standing Status:   Future     Standing Expiration Date:   7/25/2023     Order Specific Question:   Release to patient     Answer:   Immediate   • Elsie-Barr virus VCA antibody panel     Standing Status:   Future     Number of Occurrences:   1     Standing Expiration Date:   7/25/2023     Order Specific Question:   Release to patient     Answer:   Immediate   • POCT rapid strep A     Order Specific Question:   Release to patient     Answer:   Immediate           _____________________  Maggie Mancini, DO  07/25/22      "

## 2022-07-25 NOTE — TELEPHONE ENCOUNTER
Triage- Patient called in stating that he is experiencing symptoms of Fever 99.4 /sore throat/Fatigue/dizziness. Patient would like to know what it is. Patient can be reached @ [phone]

## 2022-07-25 NOTE — TELEPHONE ENCOUNTER
Patient with 24 hours of cough,sore throat,hoarse voice. Felt fatigued and dizzy last PM. This am has temp to 99.4. Took at home test this am that was negative. Patient amenable to tier 2 appt with PCP at 10:45am today and aware how visit will proceed.    Rodolfo    You are scheduled for a sick visit appointment at our office.   • Please bring a working, charged cell phone with you for your visit.   o The Medical assistant and the provider will be conducting certain portions of your visit via telephone.  • With your [employer] account, eCheck-in, prior to arriving for your appt.   o Cick on appt tab to complete eCheck-in for this upcoming appt.  • Park in the garage and wait in ground floor sitting area([facility]) and we will call around your scheduled appointment time when the Medical Assistant is ready to start the visit.  o They will instruct you to come up to the second floor and you will be routed in through our back door (by the stairway).     **Please do not come up to the suite until you receive a call from us.     **Please always wear a face covering, stay 6 ft from any persons, and come alone to appointment (exception is one parent).    Thank for your patience and cooperation during this time.  We will see you soon.    [facility]  pt cell [phone]

## 2022-07-25 NOTE — ASSESSMENT & PLAN NOTE
Fever, sore throat and cough that began ~24hrs ago  Flu + rapid strep neg   Benign physical exam    - f/u monospot, EBV panel, COVID  - focus on symptom management  - advised to call office if fever>103F, worsening sx

## 2022-07-26 ENCOUNTER — TELEPHONE (OUTPATIENT)
Dept: FAMILY MEDICINE | Facility: CLINIC | Age: 42
End: 2022-07-26
Payer: COMMERCIAL

## 2022-07-26 DIAGNOSIS — U07.1 COVID-19: Primary | ICD-10-CM

## 2022-07-26 LAB
EBV NA IGG SER IA-ACNC: >600 U/ML (ref 0–17.9)
EBV VCA IGG SER IA-ACNC: 133 U/ML (ref 0–17.9)
EBV VCA IGM SER IA-ACNC: <36 U/ML (ref 0–35.9)
SERVICE CMNT-IMP: ABNORMAL

## 2022-07-26 NOTE — TELEPHONE ENCOUNTER
Patient tested positive for COVID yesterday 07.25.22. Patient asking for any medication(s) that can help with symptoms. He is interested in antiviral as well. Patient can be reached at [phone] or [phone].

## 2022-07-26 NOTE — TELEPHONE ENCOUNTER
Call patient discussed with him his diagnosis of COVID positive sore throat and fever.  He has underlying medical complications of obesity and schizophrenia.  We will begin treatment with Paxlovid  3 tabs twice daily for 5 days.  Discussed possible side effects.  He is can hold the atorvastatin but continue his other medications follow-up as needed.  Continue with other supportive measures

## 2022-07-26 NOTE — PROGRESS NOTES
"I performed a history and physical examination of the patient and discussed the management with the Resident. I reviewed the Resident's note and agree with the documented findings and plan of care, except for my comments below or within the additional notes today.   height is 1.88 m (6' 2\") and weight is 128 kg (282 lb). His temperature is 38.7 °C (101.7 °F) (abnormal). His respiration is 20.   Neck for evaluation of sore throat and fever.  Must rule out strep COVID and possibly mononucleosis.  Difficult to see posterior pharynx secondary to tongue.  Cultures were performed.  COVID testing done as well.  I  I agree with resident's assessment and plan pending cultures  "

## 2022-07-26 NOTE — TELEPHONE ENCOUNTER
Patient seen by PCP yesterday for tier 2 appt-patient aware that swab done at appt has come back COVID+. Patient states he is only permitted to be out of work for 5 days and asking if he can be prescribed antiviral. Onset symptoms 7/24/22,previous OV 5/2022 and most recent BMP was 1/2022. Patient has elevated BMI. Uses local [facility] which has adequate stock on hand.  [phone]/cell [phone]

## 2022-07-28 LAB
BACTERIA SPEC RESP CULT: NORMAL
BACTERIA SPEC RESP CULT: NORMAL

## 2022-08-05 ENCOUNTER — TELEPHONE (OUTPATIENT)
Dept: CASE MANAGEMENT | Facility: CLINIC | Age: 42
End: 2022-08-05
Payer: COMMERCIAL

## 2022-08-05 NOTE — TELEPHONE ENCOUNTER
SW received message through the portal to help Rodolfo with psychiatry referral. Left Rodolfo a VM to consult further. Sent [employer] message with psychiatrists that accept his insurance and some that are self pay. Will remain available for support.        AFRICA Garza  [phone]

## 2022-09-14 DIAGNOSIS — E78.5 DYSLIPIDEMIA: ICD-10-CM

## 2022-09-14 RX ORDER — ATORVASTATIN CALCIUM 10 MG/1
10 TABLET, FILM COATED ORAL
Qty: 90 TABLET | Refills: 1 | Status: SHIPPED | OUTPATIENT
Start: 2022-09-14 | End: 2022-12-13

## 2022-09-14 RX ORDER — PROPRANOLOL HYDROCHLORIDE 10 MG/1
10 TABLET ORAL
Qty: 90 TABLET | Refills: 1 | Status: SHIPPED | OUTPATIENT
Start: 2022-09-14 | End: 2023-03-12 | Stop reason: SDUPTHER

## 2022-09-14 NOTE — TELEPHONE ENCOUNTER
Medicine Refill Request    Last Office: 7/25/2022   Last Consult Visit: Visit date not found  Last Telemedicine Visit: Visit date not found    Next Appointment: Visit date not found      Current Outpatient Medications:     atorvastatin (LIPITOR) 10 mg tablet, Take 1 tablet (10 mg total) by mouth once daily., Disp: 90 tablet, Rfl: 1    escitalopram (LEXAPRO) 20 mg tablet, Take 15 mg by mouth daily., Disp: , Rfl:     fluticasone propionate (FLONASE) 50 mcg/actuation nasal spray, Administer 1 spray into each nostril daily., Disp: 16 g, Rfl: 5    OLANZapine 5 mg tablet, Take 1 tablet by mouth nightly., Disp: , Rfl:     propranoloL (INDERAL) 10 mg tablet, Take 1 tablet (10 mg total) by mouth once daily., Disp: 90 tablet, Rfl: 1      BP Readings from Last 3 Encounters:   05/03/22 120/82   12/29/21 116/70   08/04/21 (!) 166/92       Recent Lab results:  Lab Results   Component Value Date    CHOL 167 01/13/2022   ,   Lab Results   Component Value Date    HDL 38 (L) 01/13/2022   ,   Lab Results   Component Value Date    LDLCALC 106 (H) 01/13/2022   ,   Lab Results   Component Value Date    TRIG 128 01/13/2022        Lab Results   Component Value Date    GLUCOSE 105 (H) 01/13/2022   , No results found for: HGBA1C      Lab Results   Component Value Date    CREATININE 0.78 01/13/2022       Lab Results   Component Value Date    TSH 1.000 01/13/2022

## 2022-09-19 ENCOUNTER — TELEPHONE (OUTPATIENT)
Dept: FAMILY MEDICINE | Facility: CLINIC | Age: 42
End: 2022-09-19
Payer: COMMERCIAL

## 2022-09-20 ENCOUNTER — TELEPHONE (OUTPATIENT)
Dept: CASE MANAGEMENT | Facility: CLINIC | Age: 42
End: 2022-09-20
Payer: COMMERCIAL

## 2022-09-20 DIAGNOSIS — Z01.83 BLOOD TYPING ENCOUNTER: Primary | ICD-10-CM

## 2022-09-20 NOTE — TELEPHONE ENCOUNTER
Call from Rodolfo reporting difficulty connecting with psychiatry providers this SW provided to him. Reviewed list and offered support. Sent new message with consolidated list that accepts his insurances to try. He notes that he has a current therapist, so would like to continue with them instead of changing to new provider. Reviewed that most places require both therapy and psychiatry, but there's a long therapy wait list. Encouraged him to contact this SW should he need further support. Will follow up.        Madie Lake, RODOLFO  [phone]

## 2022-09-23 ENCOUNTER — PATIENT OUTREACH (OUTPATIENT)
Dept: CASE MANAGEMENT | Facility: CLINIC | Age: 42
End: 2022-09-23
Payer: COMMERCIAL

## 2022-09-23 NOTE — PROGRESS NOTES
BOSTON received message from Dr. Mancini requesting outreach to Rodolfo in order to help him connect to psychiatry that accepts his insurance, per his request. BOSTON sent [employer] message with providers. Rodolfo called BOSTON reporting difficulty connecting. BOSTON reviewed list provided and consolidated with more options. BOSTON received call from Rodolfo on 9/23 stating he called and left 's for [facility], [facility], [facility]. He called [facility] and they're not accepting new patients until April 2023. BOSTON called [facility], [facility], and [facility] and left VM's requesting call back to schedule intake. Will continue to follow.        Madie Lake, FARICA  [phone]

## 2022-09-28 LAB
ABO GROUP BLD: NORMAL
RH BLD: POSITIVE

## 2022-09-30 ENCOUNTER — PATIENT OUTREACH (OUTPATIENT)
Dept: CASE MANAGEMENT | Facility: CLINIC | Age: 42
End: 2022-09-30
Payer: COMMERCIAL

## 2022-09-30 NOTE — PROGRESS NOTES
SALLY received from Rodolfo stating he still has not been able to connect to psychiatry with resources provided. BOSTON received call from [facility] that they are not accepting Medicare. BOSTON called [facility] directly and spoke to person who confirmed they accept Rodolfo's insurance. They say he would need to enroll in both therapy and psychiatry at their practice. They do have an appt available as soon as Monday for intake, but psychiatry consult would not be until 11/11. BOSTON provided Rodolfo's info again. BOSTON left Rodolfo a VM and sent [employer] message. Will remain available prn.          Madie Lake, RODOLFO  [phone]

## 2022-10-03 NOTE — PROGRESS NOTES
BOSTON received VM from Rodolfo that he's unable to connect to [facility] because they require him to do both therapy and psychiatry. He says he already has a therapist through his [facility] that he'll have for 2 years.   Unfortunately, his dual insurance ([employer] medicare and medical assistance) has limited options for coverage. All providers will require him to do therapy and psychiatry at their practice.   No further support that BOSTON can provide for his psychiatry second opinion consult request. All appropriate referrals given.

## 2022-11-23 ENCOUNTER — TELEPHONE (OUTPATIENT)
Dept: FAMILY MEDICINE | Facility: CLINIC | Age: 42
End: 2022-11-23
Payer: COMMERCIAL

## 2022-11-23 NOTE — TELEPHONE ENCOUNTER
Patient informed me that he has had a total of 5 COVID vaccines,the most recent being a Pfizer on 8/29/22-asking if there is a new/updated vaccine that he should be getting at this time?  [phone]

## 2022-11-23 NOTE — TELEPHONE ENCOUNTER
Left  for patient and informed him that he has appropriate amount of vaccines for COVID(5 per his report) that he should be UTD at this time-asked patient to call office if he has any questions/concerns

## 2022-12-09 ENCOUNTER — PATIENT OUTREACH (OUTPATIENT)
Dept: FAMILY MEDICINE | Facility: CLINIC | Age: 42
End: 2022-12-09
Payer: COMMERCIAL

## 2022-12-09 NOTE — PROGRESS NOTES
Social Work Follow up Note    SW received a voicemail from Pt asking for a call. SW returned Pt's call, but he did not answer. SW left a message requesting a call back.      Maya Costa, [facility]  [phone]

## 2022-12-13 ENCOUNTER — OFFICE VISIT (OUTPATIENT)
Dept: FAMILY MEDICINE | Facility: CLINIC | Age: 42
End: 2022-12-13
Payer: COMMERCIAL

## 2022-12-13 VITALS
DIASTOLIC BLOOD PRESSURE: 90 MMHG | RESPIRATION RATE: 18 BRPM | HEIGHT: 74 IN | WEIGHT: 270 LBS | SYSTOLIC BLOOD PRESSURE: 132 MMHG | TEMPERATURE: 98.4 F | BODY MASS INDEX: 34.65 KG/M2 | OXYGEN SATURATION: 97 % | HEART RATE: 102 BPM

## 2022-12-13 DIAGNOSIS — F20.9 CHRONIC SCHIZOPHRENIA (CMS/HCC): Primary | ICD-10-CM

## 2022-12-13 PROCEDURE — 99213 OFFICE O/P EST LOW 20 MIN: CPT | Mod: GE

## 2022-12-13 PROCEDURE — 3008F BODY MASS INDEX DOCD: CPT

## 2022-12-13 ASSESSMENT — PATIENT HEALTH QUESTIONNAIRE - PHQ9
SUM OF ALL RESPONSES TO PHQ QUESTIONS 1-9: 5
SUM OF ALL RESPONSES TO PHQ9 QUESTIONS 1 & 2: 2

## 2022-12-13 NOTE — PATIENT INSTRUCTIONS
Please talk your work about the light irritation and see how they can help solve this issue    I will reach out to social work and our behavioral health team to help find another psychiatrist    Please get blood work done. I will contact you with results. Fast for 8 hrs beforehand. You may drink water and take your usual medications    Please return in 2 months for follow up
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
## 2022-12-13 NOTE — ASSESSMENT & PLAN NOTE
Pt with history of schizophrenia   Now off of all medications besides propanolol (taking for hx intermittent palpitations)  PHQ9 = 5; issues with sleep latency. Is going to try OTC diphenhydramine  GAD7 = 1  Pt declines to start seroquel or further medication at this time    - discussed sleep hygiene to improve sleep latency  - Ok to try OTC sleep medication  - f/u fasting TSH, BMP, LFTs, lipid panel  - will reach out to SW and BH team

## 2022-12-14 ENCOUNTER — PATIENT OUTREACH (OUTPATIENT)
Dept: FAMILY MEDICINE | Facility: CLINIC | Age: 42
End: 2022-12-14
Payer: COMMERCIAL

## 2022-12-14 NOTE — PROGRESS NOTES
SOCIAL WORK NOTE    SW received a message from Pt asking for assistance locating a pro-gary  to help expunge something on his record. SW does not have any more details. SW returned Pt's all, but he did not answer so SW left a message requesting a return call. [place] will offer Pt the [place] [employer] ([phone]) and  of [facility] ([phone]).  Pt was provided with [facility]'s contact information and is aware to contact SW as needed.  Maya Costa, HARLEEN  [phone]

## 2022-12-19 NOTE — PROGRESS NOTES
"Subjective      Patient ID: Rodolfo Stafford is a 42 y.o. male here for the following:   Cough    HPI  Cough  Started: 2 wks  Productive: not really  Change since onset: none  Worse with: happens more at work  Better with: cough syrup (sometimes)    Has hx intermittent heartburn x 2010. Sometimes takes tums which helps  Has seasonal allergies: pollen. Takes allergra when has flares  Hx exercise induced asthma as child - grew out of it  Works as     Denies fever/chills, recent illness, recent COVID infxn (within last 3 mo) CP, SOB, TB exposure, smoking hx, change in cough with food or ToD or position, abd pain, watery eye discharge, sneezing, otalgia, postnasal drip    The following have been reviewed and updated as appropriate in this visit:  Allergies  Meds  Problems  Social History  Patient Active Problem List   Diagnosis   • Decreased sex drive   • Obesity (BMI 30-39.9)   • Chronic schizophrenia (CMS/HCC)   • Anxiety and depression   • Dyslipidemia   • Sensation of fullness in both ears   • Sore throat   • Subacute cough    .    Social History     Tobacco Use   • Smoking status: Never   • Smokeless tobacco: Never   Vaping Use   • Vaping Use: Never used   Substance Use Topics   • Alcohol use: Never   • Drug use: Never       Allergies as of 12/20/2022   • (No Known Allergies)         Current Outpatient Medications:   •  pantoprazole (PROTONIX) 40 mg EC tablet, Take 1 tablet (40 mg total) by mouth daily., Disp: 21 tablet, Rfl: 0  •  propranoloL (INDERAL) 10 mg tablet, Take 1 tablet (10 mg total) by mouth once daily., Disp: 90 tablet, Rfl: 1      Review of systems as per HPI and below    Review of Systems  Objective   Vitals:   Vitals:    12/20/22 0845   BP: 124/72   BP Location: Left upper arm   Patient Position: Sitting   Pulse: 82   Temp: 36.7 °C (98 °F)   TempSrc: Temporal   SpO2: 96%   Weight: 122 kg (270 lb)   Height: 1.88 m (6' 2\")     Body mass index is 34.67 kg/m².    Physical Exam  Vitals reviewed. "   Constitutional:       General: He is not in acute distress.  HENT:      Right Ear: External ear normal.      Left Ear: Tympanic membrane and external ear normal.      Ears:      Comments: Circumferential erythema around b/l canals     Mouth/Throat:      Pharynx: Uvula midline. No oropharyngeal exudate or posterior oropharyngeal erythema.      Tonsils: No tonsillar exudate.   Eyes:      General: No scleral icterus.     Extraocular Movements: Extraocular movements intact.      Conjunctiva/sclera: Conjunctivae normal.      Pupils: Pupils are equal, round, and reactive to light.   Cardiovascular:      Rate and Rhythm: Normal rate and regular rhythm.      Heart sounds: Normal heart sounds.   Pulmonary:      Effort: Pulmonary effort is normal.      Breath sounds: Normal breath sounds.   Abdominal:      General: There is no distension.      Palpations: Abdomen is soft.      Tenderness: There is no abdominal tenderness.   Musculoskeletal:         General: No swelling or deformity.      Cervical back: Normal range of motion.      Right lower leg: No edema.      Left lower leg: No edema.   Lymphadenopathy:      Cervical: No cervical adenopathy.   Skin:     General: Skin is warm and dry.   Neurological:      Mental Status: He is alert and oriented to person, place, and time.       ASSESSMENT & PLAN    Problem List Items Addressed This Visit        Respiratory    Subacute cough - Primary     Subacute cough x 2 wks  With hx more likely 2/2 lingering viral illness vs reflux sx rather than allergies/asthma given has been working in environment for 4yr and sx started recently    - trial PPI x 2-3 wks  - trial daily allegra  - f/u COVID swab         Relevant Orders    COVID- 19 PCR Symptomatic (includes FLU A/B & RSV) - [facility]       Orders Placed This Encounter   Procedures   • COVID- 19 PCR Symptomatic (includes FLU A/B & RSV) - [facility]     Standing Status:   Future     Standing Expiration Date:   12/20/2023     Order Specific  "Question:   Reason for testing:     Answer:   Symptomatic     Order Specific Question:   COVID-19 PUI? (Always yuliya \"yes\" regardless of resulting lab)     Answer:   Yes     Order Specific Question:   Symptomatic for COVID-19 as defined by CDC?     Answer:   Yes     Order Specific Question:   Date of Symptom Onset     Answer:   12/6/2022     Order Specific Question:   Hospitalized for COVID-19?     Answer:   No     Order Specific Question:   Admitted to ICU for COVID-19?     Answer:   No     Order Specific Question:   Employed in healthcare setting?     Answer:   No     Order Specific Question:   Resident in a congregate (group) care setting?     Answer:   No     Order Specific Question:   Release to patient     Answer:   Immediate     Order Specific Question:   Source of Specimen(s)     Answer:   Nose [27]           _____________________  Maggie Mancini, DO  12/20/22      "

## 2022-12-20 ENCOUNTER — OFFICE VISIT (OUTPATIENT)
Dept: FAMILY MEDICINE | Facility: CLINIC | Age: 42
End: 2022-12-20
Payer: COMMERCIAL

## 2022-12-20 VITALS
BODY MASS INDEX: 34.65 KG/M2 | SYSTOLIC BLOOD PRESSURE: 124 MMHG | HEART RATE: 82 BPM | OXYGEN SATURATION: 96 % | DIASTOLIC BLOOD PRESSURE: 72 MMHG | WEIGHT: 270 LBS | TEMPERATURE: 98 F | HEIGHT: 74 IN

## 2022-12-20 DIAGNOSIS — R05.2 SUBACUTE COUGH: Primary | ICD-10-CM

## 2022-12-20 PROCEDURE — 3008F BODY MASS INDEX DOCD: CPT

## 2022-12-20 PROCEDURE — 87637 SARSCOV2&INF A&B&RSV AMP PRB: CPT

## 2022-12-20 PROCEDURE — 99213 OFFICE O/P EST LOW 20 MIN: CPT | Mod: GE

## 2022-12-20 RX ORDER — PANTOPRAZOLE SODIUM 40 MG/1
40 TABLET, DELAYED RELEASE ORAL DAILY
Qty: 21 TABLET | Refills: 0 | Status: SHIPPED | OUTPATIENT
Start: 2022-12-20 | End: 2023-02-21

## 2022-12-20 NOTE — ASSESSMENT & PLAN NOTE
Subacute cough x 2 wks  With hx more likely 2/2 lingering viral illness vs reflux sx rather than allergies/asthma given has been working in environment for 4yr and sx started recently    - trial PPI x 2-3 wks  - trial daily allegra  - f/u COVID swab

## 2022-12-20 NOTE — PATIENT INSTRUCTIONS
Your cough may be caused by a lot of things. It is necessary to try a few different things to see if they help your cough    Please take protonix 40mg daily  Please take your allegra daily    We will get back to you with results of the COVID swab    If your cough does not improve in 2-3 weeks please let us know

## 2022-12-21 LAB
FLUAV RNA SPEC QL NAA+PROBE: NEGATIVE
FLUBV RNA SPEC QL NAA+PROBE: NEGATIVE
RSV RNA SPEC QL NAA+PROBE: NEGATIVE
SARS-COV-2 RNA RESP QL NAA+PROBE: NEGATIVE

## 2022-12-23 NOTE — PROGRESS NOTES
"I have discussed the patient's care with the Resident. I have reviewed the patient's history and Resident's findings on exam, the patient's diagnosis/differential diagnosis and treatment plan. I concur with the treatment plan as documented by the Resident, except for my comments below or within additional notes today.   height is 1.88 m (6' 2\") and weight is 122 kg (270 lb). His temperature is 36.9 °C (98.4 °F). His blood pressure is 132/90 and his pulse is 102 (abnormal). His respiration is 18 and oxygen saturation is 97%.       "

## 2023-01-04 ENCOUNTER — TELEPHONE (OUTPATIENT)
Dept: ADMISSIONS | Facility: HOSPITAL | Age: 43
End: 2023-01-04
Payer: COMMERCIAL

## 2023-01-04 NOTE — TELEPHONE ENCOUNTER
Initial Intake    Rodolfo Stafford, a 42 y.o. male     General  Reason for seeking services (in client's own words)?: Patient work therapist  FATIMAH stated he needs a new therapist.Yajaira does not see patients with schizophrenia-referred patient to ER or crisis and he is not interested    Current Mental Health Symptoms  Current Symptoms: Other  Other Mental Health History - Please describe: schizoprehenia hear voices edaily see hallicunations every once in a while every few days seeing people    Mental Health Treatment History  Prior Treatment Reported?: Yes  Type of Treatment: Outpatient    Outpatient  Details: individual therapy    Medical  Extensive History: Other  Other Problems?: PVC  Are you currently experiencing any medical problems that you feel may require emergency care?: No  Are you able to complete ADLs?: yes  Do you require the use of any assistive devices?: No  Medications: propanaline 10mg    Suicide Thoughts/Plans  Have you had suicidal thoughts in the past 72 hours?: No  Have you ever had suicidal thoughts?: Yes  Describe: involuntary feelings but not voluntary feelings  Do you have a plan?: No  Have you ever attempted?: No  Have you ever harmed yourself?: No  Do you have easy access to firearms?: No    Violence/Trauma  Do you currently have, or have you ever had, thoughts of harming someone else?: No  Any history of an event that you would consider emotionally/psychologically/physically traumatic?: yes    Employment and Legal  Are you actively employed?: Yes  Do you need help with paperwork? (FMLA, short-term disability): No  Are you presently or have you ever been a ? (Career or Volunteer): No  Do you now or have you in the past had any legal involvement?: No    Substance Use Details  Substance Use Includes: None      Eating Disorders  Do you have any problematic food related behaviors?: No  Have you ever been preoccupied with your looks or body image?: No    Additional  Information  Determination: Patient not appropriate for [facility], referred out  Comments: [facility] does not see patients with schizophrenia-referred patient to ER or crisis and he is not interested

## 2023-01-10 LAB
ALBUMIN SERPL-MCNC: 4.5 G/DL (ref 3.6–5.1)
ALBUMIN/GLOB SERPL: 2 (CALC) (ref 1–2.5)
ALP SERPL-CCNC: 59 U/L (ref 36–130)
ALT SERPL-CCNC: 47 U/L (ref 9–46)
AST SERPL-CCNC: 28 U/L (ref 10–40)
BILIRUB DIRECT SERPL-MCNC: 0.1 MG/DL
BILIRUB INDIRECT SERPL-MCNC: 0.6 MG/DL (CALC) (ref 0.2–1.2)
BILIRUB SERPL-MCNC: 0.7 MG/DL (ref 0.2–1.2)
BUN SERPL-MCNC: 10 MG/DL (ref 7–25)
BUN/CREAT SERPL: NORMAL (CALC) (ref 6–22)
CALCIUM SERPL-MCNC: 9.3 MG/DL (ref 8.6–10.3)
CHLORIDE SERPL-SCNC: 104 MMOL/L (ref 98–110)
CHOLEST SERPL-MCNC: 200 MG/DL
CHOLEST/HDLC SERPL: 5.3 (CALC)
CO2 SERPL-SCNC: 28 MMOL/L (ref 20–32)
CREAT SERPL-MCNC: 0.68 MG/DL (ref 0.6–1.29)
EGFRCR SERPLBLD CKD-EPI 2021: 121 ML/MIN/1.73M2
GLOBULIN SER CALC-MCNC: 2.2 G/DL (CALC) (ref 1.9–3.7)
GLUCOSE SERPL-MCNC: 95 MG/DL (ref 65–99)
HDLC SERPL-MCNC: 38 MG/DL
LDLC SERPL CALC-MCNC: 135 MG/DL (CALC)
NONHDLC SERPL-MCNC: 162 MG/DL (CALC)
POTASSIUM SERPL-SCNC: 4.1 MMOL/L (ref 3.5–5.3)
PROT SERPL-MCNC: 6.7 G/DL (ref 6.1–8.1)
SODIUM SERPL-SCNC: 139 MMOL/L (ref 135–146)
TRIGL SERPL-MCNC: 144 MG/DL
TSH SERPL-ACNC: 1.36 MIU/L (ref 0.4–4.5)

## 2023-01-23 ENCOUNTER — TELEPHONE (OUTPATIENT)
Dept: FAMILY MEDICINE | Facility: CLINIC | Age: 43
End: 2023-01-23
Payer: COMMERCIAL

## 2023-01-23 NOTE — TELEPHONE ENCOUNTER
"Patient developed vomiting last PM and started with diarrhea this am. Patient took Pepto Bismol a few hours ago which slowed things down a bit. Patient unsure if this is related to possible ingestion of  chicken salad around 4pm yesterday. Patient stated he was \"too weak\" to safely come to office for evaluation,but wanted advice on what he can do to improve current symptoms.  Patient informed me he has not vomited since 10pm . Diarrhea is non bloody and mostly watery. Patient denies fevers or nausea. Patient can't recall if he has voided today,but denies cracked lips,dry mouth. Patient is tolerating water and gatorade currently. I advised patient that he likely had ingested bad chicken salad. I asked patient to monitor urine output,force fluids,advised advancing to BRAT diet if he felt like trying something solid to eat. I provided ED precautions if symptoms persist or worsen. Patient was amenable to plan and appreciated call.  "

## 2023-01-30 ENCOUNTER — PATIENT OUTREACH (OUTPATIENT)
Dept: FAMILY MEDICINE | Facility: CLINIC | Age: 43
End: 2023-01-30
Payer: COMMERCIAL

## 2023-01-30 NOTE — PROGRESS NOTES
"Social Work Follow up Note    SW received a call from Pt. He wanted support locating mental health services in network with his insurance plans ([facility] and [facility]). Pt has limited options but was instructed to call [facility] and [facility]. SW provided Pt with the contact information over the phone. Pt was also interested in how to gain meaningful employment with a criminal record. SW explained the \"fair chance\" law. Pt can search \"fair chance employers\" via [employer] or other job sites to locate companies who give second chances to persons with records.Pt was appreciative and agreed to contact SW with any future concerns.    Maya Costa, HARLEEN  [phone]  "

## 2023-02-16 ENCOUNTER — TELEPHONE (OUTPATIENT)
Dept: FAMILY MEDICINE | Facility: CLINIC | Age: 43
End: 2023-02-16
Payer: COMMERCIAL

## 2023-02-16 NOTE — TELEPHONE ENCOUNTER
Patient wanted PCP to be aware that he has started taking Latuda 20mg.  suggested that patient make PCP aware that he is taking this medication due to his HX of heart palpitations,fatty liver and elevated cholesterol.  Patient contact numbers [phone]

## 2023-02-20 NOTE — TELEPHONE ENCOUNTER
Called pt  Started on Latuda by new Psychiatrist Dr. Lind  Did not discuss his hx of HLD with him  Has not started med yet due to concern of side effects    Pt has been on atypical antipsychotic in the past  Will require monitoring of lipid panel as he has in the past while on this medication  Last lipid panel 1/10/23  Current ASCVD 1.6%  Advised pt ok to start medication  Will check lipid panel after 3 mo, then annually (if psych is not already doing so)

## 2023-02-21 ENCOUNTER — OFFICE VISIT (OUTPATIENT)
Dept: FAMILY MEDICINE | Facility: CLINIC | Age: 43
End: 2023-02-21
Payer: COMMERCIAL

## 2023-02-21 VITALS
HEART RATE: 97 BPM | TEMPERATURE: 99.2 F | HEIGHT: 74 IN | RESPIRATION RATE: 14 BRPM | WEIGHT: 262 LBS | BODY MASS INDEX: 33.62 KG/M2 | OXYGEN SATURATION: 95 % | DIASTOLIC BLOOD PRESSURE: 86 MMHG | SYSTOLIC BLOOD PRESSURE: 152 MMHG

## 2023-02-21 DIAGNOSIS — L73.9 FOLLICULITIS: Primary | ICD-10-CM

## 2023-02-21 PROCEDURE — 99213 OFFICE O/P EST LOW 20 MIN: CPT | Mod: GE | Performed by: FAMILY MEDICINE

## 2023-02-21 PROCEDURE — 3008F BODY MASS INDEX DOCD: CPT | Performed by: FAMILY MEDICINE

## 2023-02-21 RX ORDER — LURASIDONE HYDROCHLORIDE 20 MG/1
20 TABLET, FILM COATED ORAL DAILY
COMMUNITY
Start: 2023-02-14 | End: 2023-08-29

## 2023-02-21 RX ORDER — MUPIROCIN 20 MG/G
1 OINTMENT TOPICAL 3 TIMES DAILY
Qty: 22 G | Refills: 1 | Status: SHIPPED | OUTPATIENT
Start: 2023-02-21 | End: 2023-02-28

## 2023-02-21 NOTE — PROGRESS NOTES
"Subjective      Patient ID: Rodolfo Stafford is a 42 y.o. male is here for the following: ?infected pubic hair    HPI     #Infected pubic hair  Patient presents to the office for evaluation of possible infected pubic hair that he started noticing since yesterday.  Admits to hx of same thing in the past, +hx of MRSA in the past where he got treated with bactrim  Denies any fevers, chills, additional skin lesions, pus/blood drainage  +TTP affected area with redness  +shaves pubic hair with electric razor-last shaved 1 week ago  No hot tub use    Review of Systems   As above      The following have been reviewed and updated as appropriate in this visit:   Allergies  Meds  Problems         Patient Active Problem List   Diagnosis   • Decreased sex drive   • Obesity (BMI 30-39.9)   • Chronic schizophrenia (CMS/HCC)   • Anxiety and depression   • Dyslipidemia   • Sensation of fullness in both ears   • Sore throat   • Subacute cough       Social History     Tobacco Use   • Smoking status: Never   • Smokeless tobacco: Never   Vaping Use   • Vaping Use: Never used   Substance Use Topics   • Alcohol use: Never   • Drug use: Never       Allergies as of 02/21/2023   • (No Known Allergies)         Current Outpatient Medications:   •  mupirocin (BACTROBAN) 2 % ointment, Apply 1 application. topically 3 (three) times a day for 7 days., Disp: 22 g, Rfl: 1  •  propranoloL (INDERAL) 10 mg tablet, Take 1 tablet (10 mg total) by mouth once daily., Disp: 90 tablet, Rfl: 1  •  LATUDA 20 mg tablet, Take 20 mg by mouth daily., Disp: , Rfl:     Objective   Vitals:    02/21/23 1456 02/21/23 1503   BP: (!) 146/80 (!) 152/86   BP Location: Right upper arm Left upper arm   Patient Position: Sitting Sitting   Pulse: 97    Resp: 14    Temp: 37.3 °C (99.2 °F)    TempSrc: Temporal    SpO2: 95%    Weight: 119 kg (262 lb)    Height: 1.88 m (6' 2\")      Body mass index is 33.64 kg/m².    Physical Exam  Skin:     Comments: Mons pubis with aprrox 2 cm " nodule on an erythematous base without fluctuance noted         Assessment/Plan:    Folliculitis (Primary)  Reassured. Usually self limited.  Topical mupirocin 3 times daily for 7 days  Prevent recurrence with BPO wash.  Cool, dry, loose clothing.  Avoid shaving in area  Consider po meds if recurrent/severe.      Other orders  -     mupirocin (BACTROBAN) 2 % ointment; Apply 1 application. topically 3 (three) times a day for 7 days.          Cata Goodson DO  2/21/2023

## 2023-03-01 ENCOUNTER — TELEPHONE (OUTPATIENT)
Dept: FAMILY MEDICINE | Facility: CLINIC | Age: 43
End: 2023-03-01
Payer: COMMERCIAL

## 2023-03-01 NOTE — TELEPHONE ENCOUNTER
"Patient portal message today:  \"Yesterday at my new job training for four hours between 2 PM and 6 PM yesterday at [employer] in [place], [place] a few hours into it I felt like I was going to faint or something like that. 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 Patient stated he has not been able to sleep through the night. Eating/drinking ok,urinating ok. Denies visual changes,CP,SOB,trouble with ambulation. When asked if patient is taking any new medications,patient stated he was supposed to start Latuda,but has not taken it \"because there are too many side effects\". Patient speech is clear and appropriate. Patient unable to come for appt in afternoon today or tomorrow due to work schedule,however was amenable to see red team provider 3/3/ at 8:45am for evaluation  "

## 2023-03-01 NOTE — TELEPHONE ENCOUNTER
Reviewed. SW unable to be present at [facility] due to scheduled day off on Friday, 3/3.  SW recommends physician f/up with Pt on current mental health supports and completes a MMSE.

## 2023-03-03 ENCOUNTER — OFFICE VISIT (OUTPATIENT)
Dept: FAMILY MEDICINE | Facility: CLINIC | Age: 43
End: 2023-03-03
Payer: COMMERCIAL

## 2023-03-03 VITALS
RESPIRATION RATE: 19 BRPM | HEART RATE: 97 BPM | HEIGHT: 75 IN | WEIGHT: 265.2 LBS | SYSTOLIC BLOOD PRESSURE: 138 MMHG | BODY MASS INDEX: 32.97 KG/M2 | OXYGEN SATURATION: 96 % | DIASTOLIC BLOOD PRESSURE: 96 MMHG | TEMPERATURE: 96.5 F

## 2023-03-03 DIAGNOSIS — F41.9 ANXIETY AND DEPRESSION: ICD-10-CM

## 2023-03-03 DIAGNOSIS — F32.A ANXIETY AND DEPRESSION: ICD-10-CM

## 2023-03-03 DIAGNOSIS — R42 LIGHTHEADEDNESS: Primary | ICD-10-CM

## 2023-03-03 DIAGNOSIS — F20.9 CHRONIC SCHIZOPHRENIA (CMS/HCC): ICD-10-CM

## 2023-03-03 PROBLEM — R05.2 SUBACUTE COUGH: Status: RESOLVED | Noted: 2022-12-20 | Resolved: 2023-03-03

## 2023-03-03 PROCEDURE — 3008F BODY MASS INDEX DOCD: CPT | Performed by: FAMILY MEDICINE

## 2023-03-03 PROCEDURE — 99214 OFFICE O/P EST MOD 30 MIN: CPT | Mod: GC | Performed by: FAMILY MEDICINE

## 2023-03-03 SDOH — ECONOMIC STABILITY: FOOD INSECURITY: WITHIN THE PAST 12 MONTHS, YOU WORRIED THAT YOUR FOOD WOULD RUN OUT BEFORE YOU GOT MONEY TO BUY MORE.: OFTEN TRUE

## 2023-03-03 SDOH — ECONOMIC STABILITY: FOOD INSECURITY: WITHIN THE PAST 12 MONTHS, THE FOOD YOU BOUGHT JUST DIDN'T LAST AND YOU DIDN'T HAVE MONEY TO GET MORE.: SOMETIMES TRUE

## 2023-03-03 ASSESSMENT — PATIENT HEALTH QUESTIONNAIRE - PHQ9
SUM OF ALL RESPONSES TO PHQ9 QUESTIONS 1 & 2: 2
SUM OF ALL RESPONSES TO PHQ QUESTIONS 1-9: 7
SUM OF ALL RESPONSES TO PHQ9 QUESTIONS 1 & 2: 2
SUM OF ALL RESPONSES TO PHQ QUESTIONS 1-9: 7

## 2023-03-03 NOTE — PROGRESS NOTES
[facility]    [address]  [place] [address]  Tel: [phone]   Fax: [phone]     History of Present Illness     Subjective     Patient ID: Rodolfo Stafford is a 42 y.o. male that presents here for the following:  Follow-up (Episode of Syncope on 2/28/2023)    HPI    Patient is here to discuss fainting/lightheaded episode 3 days ago. He was going to faint for 30 mins, it went away on its own. He did not pass out or hit his head. He has episodes like these 1-2x per week, they only happen when he is out in public. He has been dealing with his mom and causes a lot of stress. He is stressed with his work and finances. He denies motor weakness, ataxia, urinary incontinence, blurry vision, loss of vision, no slurring, facial droop. He does endorse chest pain. He has a history of palpitations and take propanolol.    He reports adequate hydration, intake 16-18 oz x 2-3. He sometimes skips lunch and has late dinner.    His psychiatrist Dr. Víctor Dexter. He hasn't taken Latuda due to side effects of high cholesterol, weight gain, prolactin. He has not been on medications for schizophrenia since October/November. He does report hearing voices and journaling nightly.     Allergies and Medications     No Known Allergies  Current Outpatient Medications   Medication Sig Dispense Refill   • propranoloL (INDERAL) 10 mg tablet Take 1 tablet (10 mg total) by mouth once daily. 90 tablet 1   • LATUDA 20 mg tablet Take 20 mg by mouth daily.       No current facility-administered medications for this visit.        Review of Systems     Review of Systems  As noted in HPI     Physical Examination     Objective     Vitals:    03/03/23 0847 03/03/23 0940 03/03/23 0941   BP: (!) 138/94 128/88 (!) 138/96   BP Location: Left upper arm Left upper arm Left upper arm   Patient Position: Sitting Sitting Standing   Pulse: 97     Resp: 19     Temp: (!) 35.8 °C (96.5 °F)     TempSrc: Temporal     SpO2: 96%    "  Weight: 120 kg (265 lb 3.2 oz)     Height: 1.892 m (6' 2.5\")       Body mass index is 33.59 kg/m².    Physical Exam  VS reviewed  General: no acute distress, appears comfortable  HEENT: normocephalic, atraumatic, EOMI  Respiratory: CTA b/l, no wheezes/rhonchi/rales, normal effort  Cardiovascular: regular rate and rhythm, no murmurs/gallops/rubs  Abdomen: soft, non-distended, non-tender, +bowel sounds  Extremities: warm, well perfused, no edema  Neuro: no focal deficits noted, AAOx3, CN II-XII, 5/5 strength in upper and lower extremities, sensation equal bilaterally in upper and lower extremities  Psych: normal affect, normal mood    GRACIELA-7 score: 6    PHQ-9 score: 2     Assessment and Plan     Diagnoses and all orders for this visit:    Lightheadedness (Primary)  Assessment & Plan:  I suspect this episode of lightheadedness was multifactorial in etiology due to increased anxiety, dehydration and potential medication induced (patient take propanolol for palpitations). I do not suspect there is a cardiac or neurologic etiology to this. Orthostatic vitals were negative in the office. I have asked patient to stay hydrated (at least 64oz of water), monitor BP at home and when he has these episodes.      Chronic schizophrenia (CMS/[facility])  Assessment & Plan:  This is uncontrolled. Patient has been off medications since Oct/Nov. He was recommended to start Latuda by his psychiatrist 2-3 weeks but patient has not started due to fear of side effects and has not communicated with his psychiatrist. He has no SI/HI.  I emphasized for patient to contact his psychiatrist to discuss alternatives.     At the end of our visit when I returned when the attending patient admitted the voices he hears have become more intrusive. When I initially asked this question he stated the voices were not intrusive. Patient also stated \"the voices in my head told me weird things while you were listening to my heart and lungs, like your  would " "be mad if I have sex with you\". Patient also said \"they told me not to say this to you while your were listening but after you left they told me to share this with you\".    I have contacted his psychiatrist to discuss concerns for uncontrolled schizophrenia and I have concerns regarding his mental health care. I have asked for his psychiatrist to connect with patient to get him started on a new regimen. I will forward the chart to  to connect him with a different psychiatrist.      Anxiety and depression           Jessica Ambrosio DO, PGY3  3/6/2023     "

## 2023-03-03 NOTE — PATIENT INSTRUCTIONS
- Stay hydrated, at least 64 oz of water daily  - Monitor blood pressure at home and when these episodes occur  - Call Dr. Dexter and discuss alternatives to Latduyen

## 2023-03-05 PROBLEM — J02.9 SORE THROAT: Status: RESOLVED | Noted: 2022-07-25 | Resolved: 2023-03-05

## 2023-03-05 PROBLEM — R42 LIGHTHEADEDNESS: Status: ACTIVE | Noted: 2023-03-05

## 2023-03-05 NOTE — ASSESSMENT & PLAN NOTE
"This is uncontrolled. Patient has been off medications since Oct/Nov. He was recommended to start Latuda by his psychiatrist 2-3 weeks but patient has not started due to fear of side effects and has not communicated with his psychiatrist. He has no SI/HI.  I emphasized for patient to contact his psychiatrist to discuss alternatives.     At the end of our visit when I returned when the attending patient admitted the voices he hears have become more intrusive. When I initially asked this question he stated the voices were not intrusive. Patient also stated \"the voices in my head told me weird things while you were listening to my heart and lungs, like your  would be mad if I have sex with you\". Patient also said \"they told me not to say this to you while your were listening but after you left they told me to share this with you\".    I have contacted his psychiatrist to discuss concerns for uncontrolled schizophrenia and I have concerns regarding his mental health care. I have asked for his psychiatrist to connect with patient to get him started on a new regimen. I will forward the chart to  to connect him with a different psychiatrist.  "

## 2023-03-05 NOTE — ASSESSMENT & PLAN NOTE
I suspect this episode of lightheadedness was multifactorial in etiology due to increased anxiety, dehydration and potential medication induced (patient take propanolol for palpitations). I do not suspect there is a cardiac or neurologic etiology to this. Orthostatic vitals were negative in the office. I have asked patient to stay hydrated (at least 64oz of water), monitor BP at home and when he has these episodes.

## 2023-03-06 ENCOUNTER — OFFICE VISIT (OUTPATIENT)
Dept: FAMILY MEDICINE | Facility: CLINIC | Age: 43
End: 2023-03-06
Payer: COMMERCIAL

## 2023-03-06 ENCOUNTER — TELEPHONE (OUTPATIENT)
Dept: FAMILY MEDICINE | Facility: CLINIC | Age: 43
End: 2023-03-06

## 2023-03-06 VITALS
WEIGHT: 265 LBS | HEIGHT: 74 IN | RESPIRATION RATE: 20 BRPM | HEART RATE: 120 BPM | DIASTOLIC BLOOD PRESSURE: 82 MMHG | BODY MASS INDEX: 34.01 KG/M2 | TEMPERATURE: 97.5 F | SYSTOLIC BLOOD PRESSURE: 130 MMHG | OXYGEN SATURATION: 97 %

## 2023-03-06 DIAGNOSIS — J02.9 SORE THROAT: Primary | ICD-10-CM

## 2023-03-06 PROBLEM — R42 LIGHTHEADEDNESS: Status: RESOLVED | Noted: 2023-03-05 | Resolved: 2023-03-06

## 2023-03-06 PROBLEM — F41.9 ANXIETY AND DEPRESSION: Status: RESOLVED | Noted: 2022-05-03 | Resolved: 2023-03-06

## 2023-03-06 PROBLEM — K75.81 NONALCOHOLIC STEATOHEPATITIS (NASH): Status: ACTIVE | Noted: 2023-03-06

## 2023-03-06 PROBLEM — H93.8X3 SENSATION OF FULLNESS IN BOTH EARS: Status: RESOLVED | Noted: 2022-05-03 | Resolved: 2023-03-06

## 2023-03-06 PROBLEM — F32.A ANXIETY AND DEPRESSION: Status: RESOLVED | Noted: 2022-05-03 | Resolved: 2023-03-06

## 2023-03-06 PROCEDURE — 3008F BODY MASS INDEX DOCD: CPT

## 2023-03-06 PROCEDURE — 87637 SARSCOV2&INF A&B&RSV AMP PRB: CPT

## 2023-03-06 PROCEDURE — 99213 OFFICE O/P EST LOW 20 MIN: CPT | Mod: GE

## 2023-03-06 RX ORDER — LIDOCAINE HYDROCHLORIDE 20 MG/ML
SOLUTION ORAL; TOPICAL
COMMUNITY
Start: 2023-03-05 | End: 2023-08-29

## 2023-03-06 NOTE — PROGRESS NOTES
"  Subjective     Patient ID: Rodolfo Stafford is a 42 y.o. male who presents regarding sore throat    HPI     #Sore throat  - Sore throat started Thursday night  - Patient is also experiencing a productive cough and post nasal drip  - Went to urgent care on Sunday.  Patient states that the rapid test was negative for strep, the culture was sent to the lab.  He was not COVID tested at urgent care.  The urgent care had prescribed him a Z-Duarte  - Patient states that he is not currently taking a Z-Duarte as he is unsure if he should be taking this given he has not been contacted regarding culture results.  - Has not taken a COVID test at home  - Patient states that there are no sick contacts at home, however works as a  and states that customers may have been sick.      Review of Systems negative for fever/chills, lymphadenopathy sinus pain, nausea, vomiting, dyspnea, abdominal pain,    Objective     Vitals:    03/06/23 1406   BP: 130/82   BP Location: Right upper arm   Patient Position: Sitting   Pulse: (!) 120   Resp: 20   Temp: 36.4 °C (97.5 °F)   TempSrc: Temporal   SpO2: 97%   Weight: 120 kg (265 lb)   Height: 1.88 m (6' 2\")     Body mass index is 34.02 kg/m².    Physical Exam  Constitutional:       General: He is not in acute distress.     Appearance: He is not toxic-appearing.   HENT:      Head: Normocephalic and atraumatic.      Right Ear: Tympanic membrane, ear canal and external ear normal. There is no impacted cerumen.      Left Ear: Tympanic membrane, ear canal and external ear normal. There is no impacted cerumen.      Mouth/Throat:      Mouth: Mucous membranes are moist.      Pharynx: Oropharynx is clear. No oropharyngeal exudate.      Comments: Mild oral pharyngeal erythema  No petechiae  No exudates  Eyes:      General:         Right eye: No discharge.         Left eye: No discharge.      Conjunctiva/sclera: Conjunctivae normal.   Cardiovascular:      Rate and Rhythm: Regular rhythm. Tachycardia " present.      Pulses: Normal pulses.      Heart sounds: No murmur heard.     No gallop.   Pulmonary:      Effort: Pulmonary effort is normal. No respiratory distress.      Breath sounds: No wheezing or rales.   Abdominal:      General: Abdomen is flat. There is no distension.      Palpations: Abdomen is soft.      Tenderness: There is no abdominal tenderness. There is no guarding.   Musculoskeletal:      Cervical back: No rigidity or tenderness.      Right lower leg: No edema.      Left lower leg: No edema.   Lymphadenopathy:      Cervical: No cervical adenopathy.         Assessment/Plan   Diagnoses and all orders for this visit:    Sore throat (Primary)  Assessment & Plan:  Suspect patient's sore throat is secondary to viral pharyngitis  Per patient, rapid strep test was negative in urgent care  Patient's Centor criteria =  0    P:  Encouraged to contact UC to f/u regarding Culture results and if they would like for him to start Zpack  Swabbed for COVID/Flu/RSV  Encouraged to continue with supportive measures as he has been doing    Orders:  -     COVID- 19 PCR Symptomatic (includes FLU A/B & RSV) - [facility]; Future        Delon Valladares, DO  03/06/23

## 2023-03-06 NOTE — PATIENT INSTRUCTIONS
Please continue with supportive care - plenty of fluids, rest, tylenol and advil as needed    Please call the urgent care to figure out if they would like for you to start taking the Z-pack    I will reach out regarding the swab done in the office today

## 2023-03-06 NOTE — ASSESSMENT & PLAN NOTE
Suspect patient's sore throat is secondary to viral pharyngitis  Per patient, rapid strep test was negative in urgent care  Patient's Centor criteria =  0    P:  Encouraged to contact UC to f/u regarding Culture results and if they would like for him to start Zpack  Swabbed for COVID/Flu/RSV  Encouraged to continue with supportive measures as he has been doing

## 2023-03-06 NOTE — TELEPHONE ENCOUNTER
"Patient portal message 3/4/23:  \"I am extremely ill with a sore throat. I have tried cough syrup, Flonase, mucus relief, as well as allergy relief, and in the future cough drops and it won’t go away. Sometimes I had postnasal drip in the past, and I am unable to attend work today unfortunately therefore for my lack of income and my boss says she has no one to cover me are you able to give me any remedies for this sickness? Thanks\"    Patient response 3/6/23:  \"Call my throat feels awful but I still haven’t taken my over the counter medicine yet. I don’t have a fever. I haven’t been tested for COVID-19 recently and I already went to [facility] urgent care yesterday and they did a strep test immediate and sent one to the lab and they’re waiting for the results. In the meantime they prescribed me a Z pack as well as LIDOCAINE 2% VISCOUS.  So if another appointment isn’t too redundant I’d be happy to come to the office today.\"    Patient amenable to appt after 12 noon today-scheduled for SDS at 2:15pm with Dr Valladares  "

## 2023-03-09 ENCOUNTER — TELEPHONE (OUTPATIENT)
Dept: FAMILY MEDICINE | Facility: CLINIC | Age: 43
End: 2023-03-09
Payer: COMMERCIAL

## 2023-03-09 NOTE — TELEPHONE ENCOUNTER
"Patient portal message last pm:  \"I have a negative strep throat test and I am wondering if I should discontinue the Z pack that I started Monday the sixth since I’m not supposed to end until Friday the 10th? the next day I’m supposed to take it is tomorrow     Also, in addition to the excessive warmth of my apartment because the thermostat does not work I am wondering if you can tell me why I feel excessively and artificially warm?\"      Patient also left message with Service last PM describing same and requesting a return call today  Patient was seen in office 3/6/23 by Dr Valladares and triple swab was negative at that time  [phone]  "

## 2023-03-10 ENCOUNTER — TELEPHONE (OUTPATIENT)
Dept: FAMILY MEDICINE | Facility: CLINIC | Age: 43
End: 2023-03-10
Payer: COMMERCIAL

## 2023-03-10 NOTE — TELEPHONE ENCOUNTER
Received fax from [facility].  Will put in PCP folder in TS 4 for review and completion.

## 2023-03-13 RX ORDER — PROPRANOLOL HYDROCHLORIDE 10 MG/1
10 TABLET ORAL
Qty: 90 TABLET | Refills: 1 | Status: SHIPPED | OUTPATIENT
Start: 2023-03-13 | End: 2023-09-28

## 2023-03-13 NOTE — TELEPHONE ENCOUNTER
Medicine Refill Request    Last Office: 3/6/2023   Last Consult Visit: Visit date not found  Last Telemedicine Visit: Visit date not found    Next Appointment: Visit date not found      Current Outpatient Medications:   •  LATUDA 20 mg tablet, Take 20 mg by mouth daily., Disp: , Rfl:   •  lidocaine 2 % solution, , Disp: , Rfl:   •  propranoloL (INDERAL) 10 mg tablet, Take 1 tablet (10 mg total) by mouth once daily., Disp: 90 tablet, Rfl: 1      BP Readings from Last 3 Encounters:   03/06/23 130/82   03/03/23 (!) 138/96   02/21/23 (!) 152/86       Recent Lab results:  Lab Results   Component Value Date    CHOL 200 (H) 01/10/2023   ,   Lab Results   Component Value Date    HDL 38 (L) 01/10/2023   ,   Lab Results   Component Value Date    LDLCALC 135 (H) 01/10/2023   ,   Lab Results   Component Value Date    TRIG 144 01/10/2023        Lab Results   Component Value Date    GLUCOSE 95 01/10/2023   , No results found for: HGBA1C      Lab Results   Component Value Date    CREATININE 0.68 01/10/2023       Lab Results   Component Value Date    TSH 1.36 01/10/2023

## 2023-03-14 ENCOUNTER — TELEPHONE (OUTPATIENT)
Dept: FAMILY MEDICINE | Facility: CLINIC | Age: 43
End: 2023-03-14
Payer: COMMERCIAL

## 2023-03-14 PROBLEM — J02.9 SORE THROAT: Status: RESOLVED | Noted: 2022-07-25 | Resolved: 2023-03-14

## 2023-03-14 NOTE — TELEPHONE ENCOUNTER
Pt. Had a sds few days ago. Still has persistent cough. He asked for advice? Or something he can take? [phone].

## 2023-03-14 NOTE — TELEPHONE ENCOUNTER
Patient referred to manager for inappropriate and sexual comments made to resident physician during 3/3/23 office visit. Additionally, on March 4, 2023, patient sent a concerning message to [employer] that included offensive and inappropriate content.     Manager completed the following immediate interventions:    1) Changed scheduling preferences to male only   2) Restricted appointment access with Dr. Jessica Ambrosio  3) Created an FYI flag for behavorial event  4) Appointment notifications added for Manager and .    Patient has been approved for discharge from the practice, effective 3/15/23. Primary care coverage for 30 days from this date via Telemedicine only. If patient requires in-person care during this 30-day window, patient must go to an Urgent Care or the Emergency room.     Next Steps:  1) Discharge letter to go out certified and regular mail today or tomorrow.    2) Discharge Letter to be scanned into the chart.    3) Restrict acces to all visit types except Telemedicine.    3) Manager to call patient to review the letter and next steps.    4)  to call patient and offer assistance with transition to new PCP and reconnect with psych if needed.     5) Dr. Tijerina/Dhaval to review and refill any appropriate medications or referrals.     6) Certified return receipt to be scanned into chart once a response is received from [employer].

## 2023-03-14 NOTE — TELEPHONE ENCOUNTER
"Patient portal message today:  \"Doctor I have this awful cough that I can’t get rid of and I’ve been sick since approximately March 2 Thursday what should I do?\"  Spoke with patient who stated strep swab from  3/5/23 had come back negative. Patient seen here in office 3/6/23 and Triple swab negative as well. Patient states sore throat slightly better,but continues to experience some rhinorrhea and tickle cough. Voice sounds appropriate and patient did not cough during call. Patient stated he has been using Nasal spray,Delsym and Allegra for symptoms. I also advised warm NSS gargles,throat lozenges, and elevating HOB with extra pillow. Patient asking if he needed appt to be re-evaluated. I advised patient that this is likely a persistent viral illness and to continue OTC measures as he has been doing so far and to notify us if symptoms worsen. Patient amenable to this plan  "

## 2023-03-15 NOTE — TELEPHONE ENCOUNTER
"Promise,  Given the number of times that he is called and contacted the office for this \"URI\", I think he should probably come in for reevaluation.  He may need antibiotics at this point but rather we evaluate him first to see if that is an option.  He was tachycardic to 120 at the last visit.    Thanks   "

## 2023-03-15 NOTE — PROGRESS NOTES
"I performed a history and physical examination of the patient and discussed the management with the Resident. I reviewed the Resident's note and agree with the documented findings and plan of care, except for my comments below or within the additional notes today.    My exam:   Psych: normal mood, blunted affect. Disheveled appearance. Cooperative behavior. Normal speech. Thought content: reports hearing voices that direct him to do things. Specifically, he said \"earlier when you were listening to my heart Dr. Ambrosio, the voices said weird things like what would your  say if I had sex with you and thought I should share what they told me.\"     See resident note for plan    Fernando Mays MD    "

## 2023-03-16 NOTE — TELEPHONE ENCOUNTER
The letter went out certified and regular mail and has been scanned into the patients chart.     I was able to connect with patient yesterday and we discussed the letter and discharge details. He was understanding and said he plans to establish care at [facility] [place]. He declined social work or additional support at this time and will call the office if he needs assistance with the transition.     Primary care services to be provided as appropropraite by phone or telemedicine until 4/13/23.

## 2023-08-28 ENCOUNTER — TELEPHONE (OUTPATIENT)
Dept: HEMATOLOGY/ONCOLOGY | Facility: HOSPITAL | Age: 43
End: 2023-08-28
Payer: COMMERCIAL

## 2023-08-28 NOTE — PROGRESS NOTES
"     Hematology Oncology  Outpatient Office Visit Note       ASSESSMENT AND PLAN     #Leukocytosis  Persistent leukocytosis on 3 lab results since April 2023 with elevated neutrophils and monocytes, latest WBC 13.3. No recent illness or infections, no known contributing medications (the cariprazine is associated with leukopenia). 20 pound weight loss and hot flashes are concerning, although weight loss was mostly intentional. At this time the differential diagnosis is broad ranging from benign leukocytosis to myeloproliferative disorders including PV , ET, CML.    - check CBC with diff  - check flow cytometry, TERRI 2 panel, BCR-ABL qualitative  - check ESR and CRP  - If blood work is non revealing and leukocytosis is persistent, then will get CT C/A/P to rule out malignancy especially given weight loss and hot flashes.       SUBJECTIVE   Diagnosis: Leukocytosis    HPI:  Mr. Rodolfo Stafford is a 43 y.o. man with PHX schizophrenia, PVCs, HLD, fatty liver disease, incarcerated from [id number]. He was referred to hematology/oncology for persistent leukocytosis.    CBCs since April 2023 all showed leukocytosis: WBC 14.4 --> 11.1 --> 13.3  Most recent CBC with increased neutrophils and monocytes. CBC was normal in 1/2022 with WBC 10.    He reports no recent illness, infections. No fevers or chills. No diaphoresis. Weight loss after changing antipsychotic since last year (around 20 pounds). He gets 'hot flashes\" almost daily, started few months ago, lasts minutes to hours, occurs at rest or on exertion, sometimes occurs in the middle of the night.     No smoking, alcohol or illicit drug use. No travel. He works as a .    Review of Systems:   As per HPI. ROS is otherwise negative.    Past Medical History:   Diagnosis Date   • Anxiety    • Depression    • Schizophrenia (CMS/HCC)       Past Surgical History:   Procedure Laterality Date   • TONSILLECTOMY     • WISDOM TOOTH EXTRACTION  2012    All 4      Family History "   Problem Relation Age of Onset   • Hyperlipidemia Biological Mother    • Stroke Biological Mother    • Depression Biological Mother    • Suicide Attempts Biological Mother    • Asthma Biological Mother    • Arthritis Biological Mother      Social History     Tobacco Use   • Smoking status: Never   • Smokeless tobacco: Never   Substance Use Topics   • Alcohol use: Not Currently      Allergies   Allergen Reactions   • Bee Pollen Other (see comments)   • Grass Pollen    • Tree And Shrub Pollen      Other reaction(s): Unknown      Current Outpatient Medications   Medication Instructions   • cariprazine (VRAYLAR) 1.5 mg capsule capsule 1 tablet, oral, Daily   • propranoloL (INDERAL) 10 mg, oral, Daily (8a)         OBJECTIVE   Heart Rate:  [84] 84  Resp:  [18] 18  BP: (145)/(74) 145/74    Wt Readings from Last 3 Encounters:   08/29/23 122 kg (269 lb)   03/06/23 120 kg (265 lb)   03/03/23 120 kg (265 lb 3.2 oz)       Physical exam  Constitutional: Appears well-developed. No distress.   HENT: Normocephalic and atraumatic.   Eyes: No pallor or icterus.  Cardiovascular: Normal heart sounds.    Pulmonary/Chest: Effort normal and breath sounds normal.   Abdominal: Soft. Bowel sounds are normal. No distention.  Musculoskeletal: Normal range of motion. No edema.   Skin: No rash. Warm.   Neurological: Alert and oriented to person, place, and time.   Psychiatric: Normal mood and affect. Behavior is normal.     Labs           Problem List Items Addressed This Visit        Mental Health    Chronic schizophrenia (CMS/HCC)    Overview     [facility]         Relevant Medications    cariprazine (VRAYLAR) 1.5 mg capsule capsule   Other Visit Diagnoses     Monocytosis    -  Primary    Relevant Orders    Sedimentation rate, automated    C-reactive protein    Flow Cytometry Blood Only    ABL KINASE DOMAIN MUTATION    CBC and differential    JAK2 [id number] Cascading Refl to CALR, JAK2 Exon 12, MPL, and CSF3R    Diff Count  w/ Path Review    Other elevated white blood cell (WBC) count        Relevant Orders    Sedimentation rate, automated    C-reactive protein    Flow Cytometry Blood Only    ABL KINASE DOMAIN MUTATION    CBC and differential        Patient Instructions   For elevated white blood cell count:  - please go to the lab for blood work  - please schedule an appointment in 4-6 weeks

## 2023-08-28 NOTE — TELEPHONE ENCOUNTER
Patient referred to [facility] Hematology/Oncology by Dr. Montoya   For leukocytosis.    Called patient, he accepted a cancellation I have for tomorrow 8/29/2023 @ 9:40am with Dr. Sammy Vasquez.

## 2023-08-29 ENCOUNTER — CONSULT (OUTPATIENT)
Dept: HEMATOLOGY/ONCOLOGY | Facility: HOSPITAL | Age: 43
End: 2023-08-29
Attending: STUDENT IN AN ORGANIZED HEALTH CARE EDUCATION/TRAINING PROGRAM
Payer: COMMERCIAL

## 2023-08-29 ENCOUNTER — APPOINTMENT (OUTPATIENT)
Dept: LAB | Facility: HOSPITAL | Age: 43
End: 2023-08-29
Attending: STUDENT IN AN ORGANIZED HEALTH CARE EDUCATION/TRAINING PROGRAM
Payer: COMMERCIAL

## 2023-08-29 VITALS
HEART RATE: 84 BPM | HEIGHT: 75 IN | OXYGEN SATURATION: 95 % | SYSTOLIC BLOOD PRESSURE: 145 MMHG | RESPIRATION RATE: 18 BRPM | BODY MASS INDEX: 33.45 KG/M2 | WEIGHT: 269 LBS | DIASTOLIC BLOOD PRESSURE: 74 MMHG

## 2023-08-29 DIAGNOSIS — D72.828 OTHER ELEVATED WHITE BLOOD CELL COUNT: ICD-10-CM

## 2023-08-29 DIAGNOSIS — F20.9 CHRONIC SCHIZOPHRENIA (CMS/HCC): ICD-10-CM

## 2023-08-29 DIAGNOSIS — D72.821 MONOCYTOSIS: Primary | ICD-10-CM

## 2023-08-29 DIAGNOSIS — D72.821 MONOCYTOSIS (SYMPTOMATIC): ICD-10-CM

## 2023-08-29 DIAGNOSIS — D72.828 OTHER ELEVATED WHITE BLOOD CELL (WBC) COUNT: ICD-10-CM

## 2023-08-29 PROCEDURE — G0463 HOSPITAL OUTPT CLINIC VISIT: HCPCS | Performed by: STUDENT IN AN ORGANIZED HEALTH CARE EDUCATION/TRAINING PROGRAM

## 2023-08-29 ASSESSMENT — PAIN SCALES - GENERAL: PAINLEVEL: 0-NO PAIN

## 2023-08-29 NOTE — PATIENT INSTRUCTIONS
For elevated white blood cell count:  - please go to the lab for blood work  - please schedule an appointment in 4-6 weeks

## 2023-08-31 NOTE — PROGRESS NOTES
I saw and evaluated the patient. I discussed the case with the fellow, and agree with the findings and plan as documented in the note.    Cassandra Steele MD

## 2023-09-03 LAB
CALR EXON 9 MUT ANL BLD/T: NOT DETECTED
CLINICAL INFO: NORMAL
CSF3R GENE EXON 14+17 MUT ANL BLD/T: NOT DETECTED
JAK2 EXON 12 MUT ANL BLD/T: NOT DETECTED
JAK2 P.V617F BLD/T QL: NOT DETECTED
MPL P.W515L+W515K+S505N BLD/T QL: NOT DETECTED
NARRATIVE DIAGNOSTIC REPORT-IMP: NORMAL
QUEST BLOCK/SPECIMEN ID: NORMAL
SERVICE CMNT 05-IMP: NORMAL
SPECIMEN SOURCE: NORMAL

## 2023-09-06 LAB
APPEARANCE BLD: NORMAL
BASOPHILS # BLD AUTO: 80 CELLS/UL (ref 0–200)
BASOPHILS NFR BLD AUTO: 0.8 %
BCR/ABL1 KINASE DOMAIN MUT ANL BLD/T: NORMAL
CELL TYPE SPEC: NORMAL
CLINICAL INFO: NORMAL
CRP SERPL-MCNC: 0.9 MG/L
EOSINOPHIL # BLD AUTO: 160 CELLS/UL (ref 15–500)
EOSINOPHIL NFR BLD AUTO: 1.6 %
ERYTHROCYTE [DISTWIDTH] IN BLOOD BY AUTOMATED COUNT: 12.1 % (ref 11–15)
ERYTHROCYTE [SEDIMENTATION RATE] IN BLOOD BY WESTERGREN METHOD: 4 MM/H
EVENTS COUNTED SPEC: 22
HCT VFR BLD AUTO: 45.4 % (ref 38.5–50)
HGB BLD-MCNC: 15.9 G/DL (ref 13.2–17.1)
LEUKEMIA MARKERS BLD-IMP: NORMAL
LYMPHOCYTES # BLD AUTO: 2480 CELLS/UL (ref 850–3900)
LYMPHOCYTES NFR BLD AUTO: 24.8 %
MCH RBC QN AUTO: 30.1 PG (ref 27–33)
MCHC RBC AUTO-ENTMCNC: 35 G/DL (ref 32–36)
MCV RBC AUTO: 86 FL (ref 80–100)
MONOCYTES # BLD AUTO: 830 CELLS/UL (ref 200–950)
MONOCYTES NFR BLD AUTO: 8.3 %
NEUTROPHILS # BLD AUTO: 6450 CELLS/UL (ref 1500–7800)
NEUTROPHILS NFR BLD AUTO: 64.5 %
PATH REV BLD -IMP: NORMAL
PATHOLOGIST NAME: NORMAL
PLATELET # BLD AUTO: 342 THOUSAND/UL (ref 140–400)
PMV BLD REES-ECKER: 9.4 FL (ref 7.5–12.5)
RBC # BLD AUTO: 5.28 MILLION/UL (ref 4.2–5.8)
SPECIMEN SOURCE: NORMAL
VIABLE CELLS NFR SPEC: NORMAL %
WBC # BLD AUTO: 10 THOUSAND/UL (ref 3.8–10.8)

## 2023-09-14 ENCOUNTER — TELEPHONE (OUTPATIENT)
Dept: PSYCHIATRY | Facility: CLINIC | Age: 43
End: 2023-09-14

## 2023-09-14 ENCOUNTER — OFFICE VISIT (OUTPATIENT)
Dept: SURGERY | Facility: CLINIC | Age: 43
End: 2023-09-14
Payer: COMMERCIAL

## 2023-09-14 VITALS — BODY MASS INDEX: 33.45 KG/M2 | HEIGHT: 75 IN | WEIGHT: 269 LBS

## 2023-09-14 DIAGNOSIS — L30.9 PERIANAL DERMATITIS: ICD-10-CM

## 2023-09-14 DIAGNOSIS — K64.5 THROMBOSED EXTERNAL HEMORRHOID: ICD-10-CM

## 2023-09-14 PROCEDURE — 99204 OFFICE O/P NEW MOD 45 MIN: CPT

## 2023-09-14 PROCEDURE — 3008F BODY MASS INDEX DOCD: CPT

## 2023-09-14 NOTE — TELEPHONE ENCOUNTER
Patient has been added to the Medication Management and Talk Therapy wait list without a referral.    Insurance: [employer] Type:    Commercial [x]   Medicaid []   [place] (if applicable)   Medicare []  [facility]  Provider Preference: Male  Virtual: Yes [x] No []    Advised the patient to contact his PCP for a referral.

## 2023-09-14 NOTE — LETTER
September 14, 2023     Chauncey Ramos DO  [address] [facility]  [place] [address]    Patient: Rodolfo Stafford  YOB: 1980  Date of Visit: 9/14/2023      Dear Dr. Ramos:    Thank you for referring Rodolfo Stafford to me for evaluation. Below are my notes for this consultation.    If you have questions, please do not hesitate to call me. I look forward to following your patient along with you.         Sincerely,        GREGORY Islas        CC: No Recipients  Celi Mayo PA C  9/14/2023 10:43 AM  Sign when Signing Visit      Chief Complaint:   Chief Complaint   Patient presents with    Hemorrhoids       HPI     Patient is a 43 y.o. male who presents with the following:      Rectal bleeding x 12 years. Bleeding occurs several times per week with BMs and is noted on tp. Has associated irritation with wiping and cleaning up after a BM. Denies dripping into bowl.  Denies sharp pain with passing of stool.     Admits to using hemorrhoid ointments and wet wipe use. Admits to perianal itching.     Reports tender external perianal lump x several months. Seen at urgent care and was told area was normal. Reports it has remained same size since onset. Denies pain with sitting. Only tender to touch.     Has BMs 3-4x per day. Soft stools.   Last colonoscopy in 2012; no polyps.    Medical History:   Past Medical History:   Diagnosis Date    Anxiety     Depression     Schizophrenia (CMS/HCC)        Surgical History:   Past Surgical History:   Procedure Laterality Date    TONSILLECTOMY      WISDOM TOOTH EXTRACTION  2012    All 4       Social History:   Social History     Socioeconomic History    Marital status: Single     Spouse name: Not on file    Number of children: Not on file    Years of education: Not on file    Highest education level: Not on file   Occupational History    Occupation: - [employer]   Tobacco Use    Smoking status: Never    Smokeless tobacco: Never   Vaping Use     Vaping Use: Never used   Substance and Sexual Activity    Alcohol use: Not Currently    Drug use: Never    Sexual activity: Not Currently     Partners: Female     Birth control/protection: Condom   Other Topics Concern    Not on file   Social History Narrative    Not on file     Social Determinants of Health     Financial Resource Strain: Not on file   Food Insecurity: Food Insecurity Present (3/3/2023)    Hunger Vital Sign     Worried About Running Out of Food in the Last Year: Often true     Ran Out of Food in the Last Year: Sometimes true   Transportation Needs: Not on file   Physical Activity: Not on file   Stress: Not on file   Social Connections: Not on file   Intimate Partner Violence: Not on file   Housing Stability: Not on file       Family History:   Family History   Problem Relation Age of Onset    Hyperlipidemia Biological Mother     Stroke Biological Mother     Depression Biological Mother     Suicide Attempts Biological Mother     Asthma Biological Mother     Arthritis Biological Mother        Allergies:   Bee pollen, Grass pollen, and Tree and shrub pollen    Current Medications:      Current Outpatient Medications:     cariprazine (VRAYLAR) 1.5 mg capsule capsule, Take 1 tablet by mouth every other day., Disp: , Rfl:     propranoloL (INDERAL) 10 mg tablet, Take 1 tablet (10 mg total) by mouth once daily., Disp: 90 tablet, Rfl: 1    Review of Systems  All 14 systems reviewed and the findings are not pertinent to the current problem.    Objective     Vital Signs  There were no vitals filed for this visit.  Body mass index is 34.08 kg/m².      Physical Exam  General Appearance:  Well developed.  Well nourished.  In no acute distress.    Anorectal Examination:    No pilonidal sinus was observed.   No pilonidal cyst was observed.   Perianal skin was not erythematous.  No perianal wart was observed.  No anal fissure was observed.   Anus did not have a fistula.   Anal sphincter tone was  normal.   No hemorrhoids were seen.   No external anal skin tags were observed.   Anus had no mass.  Rectum:  No rectal abscess was observed.   Rectal prolapse was not observed.   No rectal fistula was observed.   Rectal exam was not tender.   No rectal fluctuance was observed.  Rectal exam showed no mass.     Skin:  Dermatitis was seen on the raquel-anal skin.  Specifics:  Mildly Erythematous skin with various splits.    Right-sided edematous hemorrhoid with surrounding ecchymosis; non-tender to palpation        Problem List Items Addressed This Visit        Circulatory    Thrombosed external hemorrhoid     The thrombosed external hemorrhoid was chronic and only moderately uncomfortable and therefore was treated with conservative management.  The patient will see me on a PRN basis.              Infectious/Inflammatory    Perianal dermatitis     The patient has irritated skin around the anus and this will be treated with avoidance of hemorrhoid products and topical Calmoseptine to allow the skin to heal.   I will see them in one month.                  GREGORY Islas 9/14/2023 10:43 AM

## 2023-09-14 NOTE — ASSESSMENT & PLAN NOTE
The thrombosed external hemorrhoid was chronic and only moderately uncomfortable and therefore was treated with conservative management.  The patient will see me on a PRN basis.

## 2023-09-14 NOTE — PROGRESS NOTES
Chief Complaint:   Chief Complaint   Patient presents with    Hemorrhoids       HPI     Patient is a 43 y.o. male who presents with the following:      Rectal bleeding x 12 years. Bleeding occurs several times per week with BMs and is noted on tp. Has associated irritation with wiping and cleaning up after a BM. Denies dripping into bowl.  Denies sharp pain with passing of stool.     Admits to using hemorrhoid ointments and wet wipe use. Admits to perianal itching.     Reports tender external perianal lump x several months. Seen at urgent care and was told area was normal. Reports it has remained same size since onset. Denies pain with sitting. Only tender to touch.     Has BMs 3-4x per day. Soft stools.   Last colonoscopy in 2012; no polyps.    Medical History:   Past Medical History:   Diagnosis Date    Anxiety     Depression     Schizophrenia (CMS/[facility])        Surgical History:   Past Surgical History:   Procedure Laterality Date    TONSILLECTOMY      WISDOM TOOTH EXTRACTION  2012    All 4       Social History:   Social History     Socioeconomic History    Marital status: Single     Spouse name: Not on file    Number of children: Not on file    Years of education: Not on file    Highest education level: Not on file   Occupational History    Occupation: - [employer]   Tobacco Use    Smoking status: Never    Smokeless tobacco: Never   Vaping Use    Vaping Use: Never used   Substance and Sexual Activity    Alcohol use: Not Currently    Drug use: Never    Sexual activity: Not Currently     Partners: Female     Birth control/protection: Condom   Other Topics Concern    Not on file   Social History Narrative    Not on file     Social Determinants of Health     Financial Resource Strain: Not on file   Food Insecurity: Food Insecurity Present (3/3/2023)    Hunger Vital Sign     Worried About Running Out of Food in the Last Year: Often true     Ran Out of Food in the Last Year: Sometimes  true   Transportation Needs: Not on file   Physical Activity: Not on file   Stress: Not on file   Social Connections: Not on file   Intimate Partner Violence: Not on file   Housing Stability: Not on file       Family History:   Family History   Problem Relation Age of Onset    Hyperlipidemia Biological Mother     Stroke Biological Mother     Depression Biological Mother     Suicide Attempts Biological Mother     Asthma Biological Mother     Arthritis Biological Mother        Allergies:   Bee pollen, Grass pollen, and Tree and shrub pollen    Current Medications:      Current Outpatient Medications:     cariprazine (VRAYLAR) 1.5 mg capsule capsule, Take 1 tablet by mouth every other day., Disp: , Rfl:     propranoloL (INDERAL) 10 mg tablet, Take 1 tablet (10 mg total) by mouth once daily., Disp: 90 tablet, Rfl: 1    Review of Systems  All 14 systems reviewed and the findings are not pertinent to the current problem.    Objective     Vital Signs  There were no vitals filed for this visit.  Body mass index is 34.08 kg/m².      Physical Exam  General Appearance:  Well developed.  Well nourished.  In no acute distress.    Anorectal Examination:    No pilonidal sinus was observed.   No pilonidal cyst was observed.   Perianal skin was not erythematous.  No perianal wart was observed.  No anal fissure was observed.   Anus did not have a fistula.   Anal sphincter tone was normal.   No hemorrhoids were seen.   No external anal skin tags were observed.   Anus had no mass.  Rectum:  No rectal abscess was observed.   Rectal prolapse was not observed.   No rectal fistula was observed.   Rectal exam was not tender.   No rectal fluctuance was observed.  Rectal exam showed no mass.     Skin:  Dermatitis was seen on the raquel-anal skin.  Specifics:  Mildly Erythematous skin with various splits.    Right-sided edematous hemorrhoid with surrounding ecchymosis; non-tender to palpation        Problem List Items Addressed This  Visit        Circulatory    Thrombosed external hemorrhoid     The thrombosed external hemorrhoid was chronic and only moderately uncomfortable and therefore was treated with conservative management.  The patient will see me on a PRN basis.              Infectious/Inflammatory    Perianal dermatitis     The patient has irritated skin around the anus and this will be treated with avoidance of hemorrhoid products and topical Calmoseptine to allow the skin to heal.   I will see them in one month.                  GREGORY Islas 9/14/2023 10:43 AM

## 2023-09-14 NOTE — PATIENT INSTRUCTIONS
Raquel-anal Dermatitis    Types  There seem to be three main types of raquel-anal dermatitis that I see in my practice:  1) Contact dermatitis caused by over-use of wet wipes, preparation H and Tux wipes, and many other topical agents.  2)  Dermatitis caused by a fungal skin infection.  3)  Dermatitis of unknown origin or pruritus ani.  All three of these types can be improved with very simple measures.    Symptoms  Raquel-anal dermatitis causes tingling, stinging, and itching of the raquel-anal skin.  Some people describe a raw sensation and the feeling of burning when the shower or bath water touches that area.  People with raquel-anal dermatitis have sensitivity and pain when cleaning up after a bowel movement but NOT specifically during the act of defecation.    Physical Exam  There is usually thickening of the raquel-anal skin with tiny nicks and cuts that are only visible upon very close inspection.  This is why most physicians miss the diagnosis.  Contact dermatitis makes the skin look raw and denuded.  Fungal dermatitis looks bright red with a sharp edge around it  it from normal skin.  Pruritis ani can look like thickened, white skin with linear cracks in it.    Treatment  All of these disorders can be treated with the same two simple steps:  1) stop using wet wipes and topical hemorrhoid creams.  2)  use calmoseptine or some other inert cream that protects and soothes the skin.  Fungal infections usually resolve quickly with the application of the correct topical anti-fungal cream.  Contact dermatitis usually resolves just with avoidance of wet wipes.  Pruritis ani (in the absence of any other skin disorder like fungal infection or contact dermatitis) can be treated with steroids right off the bat.    Pitfalls  As you can see, NONE of these disorders will respond at all to Preparation H and similar hemorrhoid creams.  These agents can make the irritation worse.  Steroids are also commonly prescribed when  patients complain of raquel-anal itching, and this is a mistake.  Steroids will actually make contact dermatitis and fungal infections MUCH WORSE.

## 2023-09-28 ENCOUNTER — HOSPITAL ENCOUNTER (EMERGENCY)
Facility: HOSPITAL | Age: 43
Discharge: HOME | End: 2023-09-28
Attending: EMERGENCY MEDICINE | Admitting: EMERGENCY MEDICINE
Payer: COMMERCIAL

## 2023-09-28 VITALS
BODY MASS INDEX: 34.39 KG/M2 | RESPIRATION RATE: 18 BRPM | HEIGHT: 74 IN | DIASTOLIC BLOOD PRESSURE: 84 MMHG | TEMPERATURE: 98 F | OXYGEN SATURATION: 100 % | HEART RATE: 84 BPM | SYSTOLIC BLOOD PRESSURE: 131 MMHG | WEIGHT: 268 LBS

## 2023-09-28 DIAGNOSIS — R44.0 AUDITORY HALLUCINATIONS: ICD-10-CM

## 2023-09-28 DIAGNOSIS — G47.00 INSOMNIA, UNSPECIFIED TYPE: Primary | ICD-10-CM

## 2023-09-28 PROCEDURE — 99284 EMERGENCY DEPT VISIT MOD MDM: CPT

## 2023-09-28 ASSESSMENT — COGNITIVE AND FUNCTIONAL STATUS - GENERAL
IMPULSE CONTROL: INTACT
AROUSAL LEVEL: ALERT
LIBIDO: NON-CONTRIBUTORY
ATTENTION: WNL
PSYCHOMOTOR FUNCTIONING: WNL
JUDGEMENT: INTACT
THOUGHT_PROCESS: WORRY
THOUGHT_CONTENT: APPROPRIATE
SPEECH: SLOW AND LOW IN TONE
APPETITE: NO CHANGE
EYE_CONTACT: WNL
PERCEPTUAL FUNCTION: AUDITORY HALLUCINATIONS
CONCENTRATION: WNL
REMOTE MEMORY: WNL
RECENT MEMORY: WNL
ORIENTATION: FULLY ORIENTED
SLEEP_WAKE_CYCLE: DECREASED;EARLY MORNING WAKING
INSIGHT: INTACT
DELUSIONS: NONE OR AGE APPROPRIATE
AFFECT: FLAT
APPEARANCE: WELL GROOMED

## 2023-09-28 ASSESSMENT — ENCOUNTER SYMPTOMS
BACK PAIN: 0
SORE THROAT: 0
LIGHT-HEADEDNESS: 0
DIARRHEA: 0
HALLUCINATIONS: 1
DECREASED CONCENTRATION: 0
DYSURIA: 0
SHORTNESS OF BREATH: 0
DYSPHORIC MOOD: 0
HEMATURIA: 0
WEAKNESS: 0
PALPITATIONS: 0
CHILLS: 0
DIZZINESS: 0
HEADACHES: 0
SLEEP DISTURBANCE: 1
ABDOMINAL PAIN: 0
SEIZURES: 0
INSOMNIA: 1
NAUSEA: 0
VOMITING: 0
NERVOUS/ANXIOUS: 1
COLOR CHANGE: 0
COUGH: 0
EYE PAIN: 0
FEVER: 0
ARTHRALGIAS: 0

## 2023-09-28 NOTE — ED PROVIDER NOTES
HPI   HISTORY OF PRESENT ILLNESS     Patient is a 43-year-old male with past medical history of anxiety, depression, schizophrenia who arrives to the emergency department for auditory hallucinations and insomnia.  Patient states he is schizophrenic who is off his medication and has been hearing voices ever since 2008.  Patient states he has a therapist who he speaks to once a week and is currently on [facility] waiting list.  Patient states he gets approximately 4 to 5 hours of sleep and segments at night.  Patient states his voices are sexually based in nature and he was told by his therapist to come to the ER whenever the voices talk to him about suicidal or homicidal ideas or anything that is illegal in nature.  Patient denies suicidal or homicidal ideations.  Patient is concerned his outpatient treatments are limited and is looking for further resources.  Patient denies chest pain, shortness of breath, palpitations, cough, fever, chills, headache, vision changes, diaphoresis, lightheadedness, dizziness, numbness, tingling, nausea, vomiting, diarrhea, or constipation.        History provided by:  Patient  Insomnia  Associated symptoms: no abdominal pain, no chest pain, no cough, no diarrhea, no ear pain, no fever, no headaches, no nausea, no rash, no shortness of breath, no sore throat and no vomiting          Patient History   PAST HISTORY     Reviewed from Nursing Triage:       Past Medical History:   Diagnosis Date    Anxiety     Depression     Schizophrenia (CMS/[facility])        Past Surgical History:   Procedure Laterality Date    TONSILLECTOMY      WISDOM TOOTH EXTRACTION  2012    All 4       Family History   Problem Relation Age of Onset    Hyperlipidemia Biological Mother     Stroke Biological Mother     Depression Biological Mother     Suicide Attempts Biological Mother     Asthma Biological Mother     Arthritis Biological Mother        Social History     Tobacco Use    Smoking  status: Never    Smokeless tobacco: Never   Vaping Use    Vaping Use: Never used   Substance Use Topics    Alcohol use: Not Currently    Drug use: Never         Review of Systems   REVIEW OF SYSTEMS     Review of Systems   Constitutional: Negative for chills and fever.   HENT: Negative for ear pain and sore throat.    Eyes: Negative for pain and visual disturbance.   Respiratory: Negative for cough and shortness of breath.    Cardiovascular: Negative for chest pain and palpitations.   Gastrointestinal: Negative for abdominal pain, diarrhea, nausea and vomiting.   Genitourinary: Negative for dysuria and hematuria.   Musculoskeletal: Negative for arthralgias and back pain.   Skin: Negative for color change and rash.   Neurological: Negative for dizziness, seizures, syncope, weakness, light-headedness and headaches.   Psychiatric/Behavioral: Positive for hallucinations (Auditory hallucinations) and sleep disturbance. Negative for decreased concentration, dysphoric mood, self-injury and suicidal ideas. The patient is nervous/anxious and has insomnia.    All other systems reviewed and are negative.        VITALS     ED Vitals    Date/Time Temp Pulse Resp BP SpO2 [facility]   09/28/23 1657 36.7 °C (98 °F) 88 18 140/91 98 % KMG        Pulse Ox %: 98 % (09/28/23 1717)  Pulse Ox Interpretation: Normal (09/28/23 1717)  Heart Rate: 88 (09/28/23 1717)  Rhythm Strip Interpretation: Normal Sinus Rhythm (09/28/23 1717)     Physical Exam   PHYSICAL EXAM     Physical Exam  Vitals and nursing note reviewed.   Constitutional:       General: He is not in acute distress.     Appearance: Normal appearance. He is well-developed. He is not ill-appearing, toxic-appearing or diaphoretic.   HENT:      Head: Normocephalic and atraumatic.      Mouth/Throat:      Mouth: Mucous membranes are moist.   Eyes:      Extraocular Movements: Extraocular movements intact.      Conjunctiva/sclera: Conjunctivae normal.      Pupils: Pupils are equal, round, and  reactive to light.   Cardiovascular:      Rate and Rhythm: Normal rate and regular rhythm.      Pulses: Normal pulses.      Heart sounds: Normal heart sounds. No murmur heard.  Pulmonary:      Effort: Pulmonary effort is normal. No respiratory distress.      Breath sounds: Normal breath sounds.   Abdominal:      General: Bowel sounds are normal.      Palpations: Abdomen is soft.      Tenderness: There is no abdominal tenderness. There is no guarding or rebound.   Musculoskeletal:         General: Normal range of motion.      Cervical back: Normal range of motion and neck supple.   Skin:     General: Skin is warm and dry.      Capillary Refill: Capillary refill takes less than 2 seconds.   Neurological:      General: No focal deficit present.      Mental Status: He is alert and oriented to person, place, and time.   Psychiatric:         Mood and Affect: Mood is anxious.         Speech: Speech normal.         Behavior: Behavior normal. Behavior is cooperative.         Thought Content: Thought content does not include homicidal or suicidal ideation. Thought content does not include homicidal or suicidal plan.         Cognition and Memory: Cognition normal.           PROCEDURES     Procedures     DATA     Results     None          Imaging Results    None         No orders to display       Scoring tools                                  ED Course & MDM   MDM / ED COURSE / CLINICAL IMPRESSION / DISPO     Medical Decision Making  Impression: Patient is alert, oriented, well-appearing, and in no acute distress.  Patient presents to the ED for auditory hallucinations and insomnia.  Patient states he is schizophrenic who is off his medication and has been hearing voices ever since 2008.  Patient states he has a therapist who he speaks to once a week and is currently on [place] [facility] waiting list.  Patient states he gets approximately 4 to 5 hours of sleep and segments at night.  Patient states his voices are  sexually based in nature and he was told by his therapist to come to the ER whenever the voices talk to him about suicidal or homicidal ideas or anything that is illegal in nature.  Patient denies suicidal or homicidal ideations.  Patient is concerned his outpatient treatments are limited and is looking for further resources. Patient presents with symptoms consistent with an underlying psychiatric disorder.  Presentation not consistent with acute organic causes to include delirium, dementia, or drug introduced disorders (acute ingestions or withdrawal; no evidence of toxidrome).  Given the H&P, I suspect this patient is  Not suicidal or homicidal.  Will consult psychiatry to evaluate patient.  Patient seen and cleared by behavioral crisis.  Outpatient resources provided.   Plan: behavioral health crisis consult, safety risk assessment, reassess  Dispo: Discharge home with strict turn precautions.  Patient evaluated by both emergency and psychiatric professionals and deemed safe at this time for discharge.  Patient has a plan for psychiatric follow-up and a safe place to stay with access to physical and emotional support.  Follow-up with PCP and therapist soon as possible for reevaluation.  Diagnosis and management discussed with patient who understood and is comfortable with discharge plan.      Auditory hallucinations: chronic illness or injury  Insomnia, unspecified type: chronic illness or injury      Vital Signs Review: Vital signs have been reviewed. The oxygen saturation is SpO2: 98 % which is normal.    ED Course as of 09/28/23 1958   Thu Sep 28, 2023   1716 Patient evaluated for auditory hallucinations and insomnia.  Patient states he is schizophrenic who is off his medication and has been hearing voices ever since 2008.  Patient states he has a therapist who he speaks to once a week and is currently on [facility] waiting list.  Patient states he gets approximately 4 to 5 hours of sleep  and segments at night.  Patient states his voices are sexually based in nature and he was told by his therapist to come to the ER whenever the voices talk to him about suicidal or homicidal ideas or anything that is illegal in nature.  Patient denies suicidal or homicidal ideations.  Patient is concerned his outpatient treatments are limited and is looking for further resources. [JG]   1952 Patient was seen and cleared by behavioral crisis. [JG]   1958 Diagnosis and management discussed with patient who understood and is comfortable with discharge plan. [JG]      ED Course User Index  [JG] Oswaldo Quiroz CRNP     Clinical Impression      None               Oswaldo Quiroz CRNP  09/28/23 1959

## 2023-09-28 NOTE — DISCHARGE INSTRUCTIONS
You have been evaluated emergency department today for your psychiatric complaint.  You have been evaluated by both emergency medicine and psychiatry staff and have been cleared to go home.  Please follow-up with your psychiatrist within 2 to 3 days.  Please use the resources given to you in the emergency department.  Return to the emergency department if you experience thoughts of hurting yourself or others, audio or visual hallucinations, or for any other concerning symptoms.    CRISIS SERVICES (AVAILABLE 24/7)   [facility] ([place])   [address] [place] [address]    [phone]      National Suicide Prevention Lifeline   569.253.4842 or 988      DV SERVICES:   [facility] [phone]      DAP   [address], [facility] [place] [address]   24/7 hotline [phone]      WAA:    [address], [place] [address]   24/7 hotline [phone]      PARTIAL/INTENSIVE OUTPATIENT SERVICES   [facility])   [place]    [phone]      [facility]   Chandra Mandel   [phone]      [facility]/[place]ann[place]   [phone]      OUTPATIENT AGENCIES   CM    [place]   [phone]      [facility]   [place]   [phone]      [facility]   [place]   [phone]      [facility]    [place]   [phone]      [facility]   [place]   [phone]      [facility]   [phone]      *Sliding scale for payment   OUTPATIENT AGENCIES      [employer]   [place]   [phone]      [facility] [place], [place]   [phone]      [facility] [place], [place], [place], [place], [place]   [phone]      [facility] [place], [place], [place], [place]   [phone]      [facility]   [place], [place]   [phone]      [facility]    Kayla Matos, [facility]    [phone]      [facility]   [place]   [phone]      [facility]   [phone]      [facility]   [place]   [phone]      [facility]*    [place]    [phone]      [facility]*   [phone]      SUBSTANCE USE TREATMENT   DETOX/ REHAB      [facility]    [phone]   [address] [place] [address]: [facility]   [phone]   [address]  [address]: [facility]   [phone]   [address] [place] [address]: [facility]   [phone]   [address] [place] [address]: [facility]   [phone]   [address] [place] [address]: [facility]      [place]    [phone]   [facility], [place] [address]: [facility]   [phone]   [address] [place] [address] : [facility]   [address] [place] [address]: [facility]   [phone]   [address] [facility] [address] [place] [address]: [facility]      [facility]    [phone]   [address] [place] [address]: [facility]    [phone]   [address] [place] [address]: [facility]      [facility]   [address] [place] [address]: [facility]   [phone]   [address] [place], [place] [address]: [facility]

## 2023-09-29 NOTE — CONSULTS
"Patient Information     Patient Name  Rodolfo Stafford MRN  [id number] Legal Sex  Male  Age  1980 (43 y.o.) [facility]        Gender Identity    Patient's gender identity: Male        Admit Date Department Dept Phone    2023 [facility] Emergency Department [phone]       Presenting Problems - Thu 2023     Row Name 1953       Presenting Problems    Who accompanied patient today? Pt drove to the ED alone.    Presenting Problems Pt is a 42yo M presenting to the ED for insomnia. Pt reported he heard voices today at work and after speaking w/ his therapist yesterday was told to come to the ED for additional support if needed. Pt shared he has been suffering from auditory hallucinations since  and has tried multiple Rx w/ several psychiatrists and is dissatisfied w/ the side effects of the Rx and does not take it consistently. The Pt reported he has been on and off w/ therapy for the last 15 years; Pt shared he met w/ 2 therapists through his employer's EAP program but felt he did not click w/ either of them; Pt is continuing to explore OP therapists. Pt shared he has multiple coping mechanisms and is able to reality check when he experiences a hallucination \"I call [facility] when I am hearing a lot of voices and they are helpful sometimes...I know they are not real. They just won't go away. I feel I have tried everything.\" Pt denied SI, HI, or VH. Pt denied previous inpatient psychiatric hospitalizations. Pt denied previous SI or susicide attempts. Pt endorsed \"a general anxiety about people being after me\" when asked about paranoia or if he feels safe in the community. Pt reported the hallucinations do not offer commands. \"I heard something on the radio today but I knew it was not real and I ignored it.\" Pt shared sometimes the voices offer grandiose scenarios of his coworkers having sex. Pt shared he lives w/ his mom and feels safe at home. Pt shared he reached out to his " insurance company yesterday and they provided him w/ 3 additional OP providers. Pt shared he sleeps for 4 to 5 hours nightly. Pt denied appetite disturbances, access to firearms, or SIB. Pt reported he has several misdemeanors but has no legal issues pending.    Patient Experiencing anxiety;phobias;sleep disturbances;guilt    Phobia comments Paranoia    Sleep Disturbances Comments Difficulty remaining asleep, early waking    Stressors New job, dissatisfied w/ providers,             Mental Status Exam - Thu September 28, 2023     Row Name 2015       Mental Status Exam    Arousal Level Alert    Appearance Well Groomed    Speech Slow and low in tone    Psychomotor Functioning WNL    Eye Contact WNL    Orientation Fully oriented    Attention WNL    Concentration WNL    Recent Memory WNL    Remote Memory WNL    Thought Content Appropriate    Thought Process Worry    Insight Intact    Judgement intact    Impulse Control Intact    Perceptual Function Auditory hallucinations    Delusions None or age appropriate    Sleeping Decreased;Early Morning Waking    Appetite No Change    Libido Non-Contributory    Affect Flat    Mood Hopeful;Motivated;Anxious;Helpless             Suicide and Homicide Risk - Thu September 28, 2023     Row Name 2016       [facility] Suicide and Homicide Risk    Do you currently have any suicidal ideation or thoughts? No    Do you currently or have you had any thoughts of self-harm? No    Do you currently have homicidal ideation or have you ever harmed anyone else?  No    Do you have easy access to firearms? No            Alcohol Use     Not Currently.      Tobacco Use     Never smoked or used smokeless tobacco.      Vaping Use     Never used       Problem List  Current as of 09/28/23 2022           Chronic schizophrenia (CMS/HCC) Decreased sex drive    Dyslipidemia Nonalcoholic steatohepatitis ([place]A)    Obesity (BMI 30-39.9) Perianal dermatitis    Thrombosed external hemorrhoid       Allergies    Bee  Pollen  Grass Pollen  Tree And Shrub Pollen     Results (last 24 hours)     ** No results found for the last 24 hours. **      Medical History     Diagnosis Date Comment Source    Anxiety       Depression       Schizophrenia (CMS/[facility])         Surgical History     Procedure Laterality Date Comment Source    TONSILLECTOMY        WISDOM TOOTH EXTRACTION  2012 All 4        Mental Health/Substance Use Treatment - Thu September 28, 2023     Row Name 2016       Previous Mental Health Treatment    Previous Mental Health Treatment psychiatrist    Psychiatrist Provider/Reason Multiple previous Psychiatrists for Rx management    Psychiatrist Compliant yes       Current Mental Health Treatment    Current Mental Health Treatment psychiatrist;psychologist    Psychiatrist Provider/Reason Psychiatry for Rx management    Psychiatrist Compliant yes    Psychologist Provider/Reason Pt reported he saw a therapist yesterday and will have another appointment    Psychologist Compliant yes       Previous Substance Use Treatment    Previous Substance Use Treatment none       Current Substance Use Treatment    Current Substance Use Treatment none             Living Environment - Thu September 28, 2023     Row Name 2016       Living Environment    People in Home parent(s)    Name(s) of People in Home Hope - Mom    Current Living Arrangements home    Primary Care Provided by self    Provides Primary Care For no one    Family Caregiver if Needed none    Quality of Family Relationships helpful;involved;supportive;stressful    Able to Return to Prior Arrangements yes       County Agency Involved    County Agencies Involved? No            Employment History     Occupation Employer Comments    - [employer]       - [employer]        Family and Education     Marital Status    Single      Social Identity     Preferred Language Ethnicity Race    English Not , /a, or Estonian origin White          Diagnosis Codes -  Thu September 28, 2023     Row Name 2018       Diagnosis    Primary Code 1 F20.9    Primary Code Description 1 Schizophrenia             Recommendations/Plan - Thu September 28, 2023     Row Name 2018       Recommendations/Plan    Clinical assessment summary Pt is a 44yo M presenting to the ED for insomnia. Pt reported hearing voices today at work but was able to ignore them. Pt reported he met w/ his therapist yesterday that reviewed resources w/ the Pt and he was instructed to present to the ED for additional support and if the voices began to offer commands. Pt denied experiencing commands. Pt denied SI, HI, or VH. Pt denies access to firearms, substance use, or SIB. Pt reported he has multiple support plans in place and feels he is able to use his coping mechanisms. Pt declined inpatient Tx and requested OP resources. LOC recommendation: Pt would benefit from continued care w/ therapist and psychiatrist. Pt has additional resources for review and is encouraged to resume Rx regimen managed by his psychiatrist.    Recommended level of care Outpatient Psych individual    Patient refused treatment recommendation No    Suicide Resource Information Provided yes            Radiology Results (last 24 hours)    No matching results found     ECG Results (last 24 hours)    No matching results found     Microbiology Results     None      Home Medications    No medications reported.

## 2023-09-29 NOTE — ED ATTESTATION NOTE
Procedures  Physical Exam  Review of Systems    9/29/202312:08 AM  I have personally seen and examined the patient.  I reviewed and agree with the PA/NP/Resident's assessment and plan of care.    My examination, assessment, and plan of care of Rodolfo Stafford is as follows:  The patient presents with history of schizophrenia, hearing voices since 2008.  He states that he has been on a number of different medications such as Seroquel, Risperdone, Vraylar, etc.  He was just interviewed by his psychiatrist over a Zoom call yesterday and discussed hearing voices more frequently now.  The psychiatrist recommended the patient come to an emergency setting if he is hearing voices telling him to do things that are dangerous or illegal.  The patient tells me that he works full-time at [email or web address].  He states that he is hearing voices telling him that his coworkers are having sex together.  He is also hearing voices telling him that they are adults in the park having sex with children.  He denies the voices telling him to hurt himself or others.  Exam: The patient is alert in no acute distress.  He is mentating appropriately and has no focal neurologic deficits.  Impression/Plan: The patient was evaluated by behavioral health and he is cleared to go home and follow-up with his own psychiatrist.  We gave the patient strict return precautions.    Vital Sign Review: Vital signs have been ordered and reviewed. The oxygen saturation is 98% on room air, normal     I was physically present for the key/critical portions of the following procedures: None    This document was created using dragon dictation software.  There might be some typographical errors due to this technology.     Patricio Peterson, DO  09/29/23 0011

## 2023-09-29 NOTE — BEHAVIORAL HEALTH CRISIS PROGRESS NOTE
9/28/2023 7PM  This [facility] received the Pt care in hand off. Per hand off the Pt is presenting for insomnia and is experiencing hallucinations. Pt has not medically cleared. [facility] to follow up on medical clearance status.     9/28/2023 7:17PM  [facility] met w/ MAXWELL Chacon and Attending Dr. Peterson to discuss Pt care. Tx team reporting the Pt has denied SI or HI and presenting requesting additional resources as he was instructed by his therapist. [facility] to meet w/ Pt and assess.     9/28/2023 7:40PM  [facility] met w/ Pt at bedside. BHCA completed. Pt denied SI or HI. Pt endorsed non-commanding auditory hallucinations. Pt reported he has multiple coping mechanisms and has multiple supports in place. Pt shared he presented to the ED after his therapist instructed him to come for additional support and more resources. Pt reported he feels safe to return home. [facility] reviewed resources w/ the Pt. Tx team updated.

## 2023-11-16 ENCOUNTER — APPOINTMENT (EMERGENCY)
Dept: RADIOLOGY | Facility: HOSPITAL | Age: 43
End: 2023-11-16
Payer: COMMERCIAL

## 2023-11-16 ENCOUNTER — HOSPITAL ENCOUNTER (EMERGENCY)
Facility: HOSPITAL | Age: 43
Discharge: HOME | End: 2023-11-16
Attending: EMERGENCY MEDICINE
Payer: COMMERCIAL

## 2023-11-16 VITALS
DIASTOLIC BLOOD PRESSURE: 86 MMHG | TEMPERATURE: 98.5 F | WEIGHT: 261 LBS | SYSTOLIC BLOOD PRESSURE: 137 MMHG | OXYGEN SATURATION: 96 % | HEART RATE: 103 BPM | HEIGHT: 75 IN | RESPIRATION RATE: 18 BRPM | BODY MASS INDEX: 32.45 KG/M2

## 2023-11-16 DIAGNOSIS — R00.2 PALPITATIONS: Primary | ICD-10-CM

## 2023-11-16 PROBLEM — K76.0 FATTY LIVER: Status: ACTIVE | Noted: 2023-06-01

## 2023-11-16 PROBLEM — K64.9 HEMORRHOIDS: Status: ACTIVE | Noted: 2023-08-14

## 2023-11-16 PROBLEM — E78.2 HYPERLIPIDEMIA, MIXED: Status: ACTIVE | Noted: 2023-03-29

## 2023-11-16 PROBLEM — L73.9 FOLLICULITIS: Status: ACTIVE | Noted: 2023-08-14

## 2023-11-16 LAB
ALBUMIN SERPL-MCNC: 4.6 G/DL (ref 3.5–5.7)
ALP SERPL-CCNC: 54 IU/L (ref 34–125)
ALT SERPL-CCNC: 34 IU/L (ref 7–52)
ANION GAP SERPL CALC-SCNC: 8 MEQ/L (ref 3–15)
AST SERPL-CCNC: 25 IU/L (ref 13–39)
BASOPHILS # BLD: 0.11 K/UL (ref 0.01–0.1)
BASOPHILS NFR BLD: 0.9 %
BILIRUB SERPL-MCNC: 0.6 MG/DL (ref 0.3–1.2)
BUN SERPL-MCNC: 9 MG/DL (ref 7–25)
CALCIUM SERPL-MCNC: 9.5 MG/DL (ref 8.6–10.3)
CHLORIDE SERPL-SCNC: 104 MEQ/L (ref 98–107)
CO2 SERPL-SCNC: 28 MEQ/L (ref 21–31)
CREAT SERPL-MCNC: 0.8 MG/DL (ref 0.7–1.3)
DIFFERENTIAL METHOD BLD: ABNORMAL
EOSINOPHIL # BLD: 0.29 K/UL (ref 0.04–0.54)
EOSINOPHIL NFR BLD: 2.4 %
ERYTHROCYTE [DISTWIDTH] IN BLOOD BY AUTOMATED COUNT: 11.8 % (ref 11.6–14.4)
GFR SERPL CREATININE-BSD FRML MDRD: >60 ML/MIN/1.73M*2
GLUCOSE SERPL-MCNC: 88 MG/DL (ref 70–99)
HCT VFR BLDCO AUTO: 45.4 % (ref 40.1–51)
HGB BLD-MCNC: 15.7 G/DL (ref 13.7–17.5)
IMM GRANULOCYTES # BLD AUTO: 0.04 K/UL (ref 0–0.08)
IMM GRANULOCYTES NFR BLD AUTO: 0.3 %
LYMPHOCYTES # BLD: 4.32 K/UL (ref 1.2–3.5)
LYMPHOCYTES NFR BLD: 36.5 %
MCH RBC QN AUTO: 29.9 PG (ref 28–33.2)
MCHC RBC AUTO-ENTMCNC: 34.6 G/DL (ref 32.2–36.5)
MCV RBC AUTO: 86.5 FL (ref 83–98)
MONOCYTES # BLD: 1.18 K/UL (ref 0.3–1)
MONOCYTES NFR BLD: 10 %
NEUTROPHILS # BLD: 5.91 K/UL (ref 1.7–7)
NEUTS SEG NFR BLD: 49.9 %
NRBC BLD-RTO: 0 %
PDW BLD AUTO: 9 FL (ref 9.4–12.4)
PLATELET # BLD AUTO: 339 K/UL (ref 150–350)
POTASSIUM SERPL-SCNC: 4.1 MEQ/L (ref 3.5–5.1)
PROT SERPL-MCNC: 7.1 G/DL (ref 6–8.2)
RBC # BLD AUTO: 5.25 M/UL (ref 4.5–5.8)
SODIUM SERPL-SCNC: 140 MEQ/L (ref 136–145)
TROPONIN I SERPL HS-MCNC: <2 PG/ML
WBC # BLD AUTO: 11.85 K/UL (ref 3.8–10.5)

## 2023-11-16 PROCEDURE — 80053 COMPREHEN METABOLIC PANEL: CPT | Performed by: EMERGENCY MEDICINE

## 2023-11-16 PROCEDURE — 36415 COLL VENOUS BLD VENIPUNCTURE: CPT | Performed by: EMERGENCY MEDICINE

## 2023-11-16 PROCEDURE — 84484 ASSAY OF TROPONIN QUANT: CPT | Performed by: EMERGENCY MEDICINE

## 2023-11-16 PROCEDURE — 93005 ELECTROCARDIOGRAM TRACING: CPT | Performed by: EMERGENCY MEDICINE

## 2023-11-16 PROCEDURE — 71046 X-RAY EXAM CHEST 2 VIEWS: CPT

## 2023-11-16 PROCEDURE — 85025 COMPLETE CBC W/AUTO DIFF WBC: CPT | Performed by: EMERGENCY MEDICINE

## 2023-11-16 PROCEDURE — 99283 EMERGENCY DEPT VISIT LOW MDM: CPT | Mod: 25

## 2023-11-16 PROCEDURE — 93005 ELECTROCARDIOGRAM TRACING: CPT

## 2023-11-16 ASSESSMENT — ENCOUNTER SYMPTOMS
CONSTIPATION: 0
BACK PAIN: 0
APPETITE CHANGE: 0
WHEEZING: 0
PALPITATIONS: 1
HEADACHES: 0
SHORTNESS OF BREATH: 1
ABDOMINAL PAIN: 0
VOMITING: 0
NAUSEA: 0
ACTIVITY CHANGE: 0
DIZZINESS: 0
LIGHT-HEADEDNESS: 0
CHEST TIGHTNESS: 0

## 2023-11-17 LAB
ATRIAL RATE: 92
P AXIS: 42
PR INTERVAL: 128
QRS DURATION: 86
QT INTERVAL: 348
QTC CALCULATION(BAZETT): 430
R AXIS: 47
T WAVE AXIS: 51
VENTRICULAR RATE: 92

## 2023-11-17 NOTE — ED PROVIDER NOTES
Emergency Medicine Note  HPI   HISTORY OF PRESENT ILLNESS     Patient is a 43-year-old male with history of obesity, schizophrenia, hemorrhoids, hyperlipidemia, intermittent palpitations with PVCs presenting to the emergency department for evaluation of palpitations prior to arrival.  He states an episode today that lasted approximately 10 to 15 seconds and describes the sensation as his heart was beating heavier.  He noted mild shortness of breath with this but otherwise denied any symptoms during this episode.  On my exam he is completely asymptomatic.  He notes a history of palpitations in the past and was worked up by cardiologist in 2016 for this by wearing a Holter monitor which revealed PVCs.  He was started on propanolol which she has been taking now prescribed through his PCP, however this was stopped over this past summer as he had not had any palpitations while taking this.  He is here because he had another run today, and was told to come into the ED by his PCP to get set up with a cardiologist outpatient.  He denies chest pain, shortness of breath, lightheadedness, dizziness, abdominal pain, nausea, vomiting, syncope, fevers.            Patient History   PAST HISTORY     Reviewed from Nursing Triage:       Past Medical History:   Diagnosis Date    Anxiety     Depression     Schizophrenia (CMS/[facility])        Past Surgical History:   Procedure Laterality Date    TONSILLECTOMY      WISDOM TOOTH EXTRACTION  2012    All 4       Family History   Problem Relation Age of Onset    Hyperlipidemia Biological Mother     Stroke Biological Mother     Depression Biological Mother     Suicide Attempts Biological Mother     Asthma Biological Mother     Arthritis Biological Mother        Social History     Tobacco Use    Smoking status: Never    Smokeless tobacco: Never   Vaping Use    Vaping Use: Never used   Substance Use Topics    Alcohol use: Not Currently    Drug use: Never         Review of Systems    REVIEW OF SYSTEMS     Review of Systems   Constitutional: Negative for activity change and appetite change.   Respiratory: Positive for shortness of breath. Negative for chest tightness and wheezing.    Cardiovascular: Positive for palpitations. Negative for chest pain.   Gastrointestinal: Negative for abdominal pain, constipation, nausea and vomiting.   Musculoskeletal: Negative for back pain.   Neurological: Negative for dizziness, light-headedness and headaches.         VITALS     ED Vitals    Date/Time Temp Pulse Resp BP SpO2 [facility]   11/16/23 1856 36.9 °C (98.5 °F) 103 18 137/86 96 % HMS        Pulse Ox %: 96 % (11/16/23 1930)  Pulse Ox Interpretation: Normal (11/16/23 1930)  Heart Rate: 92 (11/16/23 1930)  Rhythm Strip Interpretation: Normal Sinus Rhythm (11/16/23 1930)     Physical Exam   PHYSICAL EXAM     Physical Exam  Vitals and nursing note reviewed.   Constitutional:       Appearance: Normal appearance. He is normal weight.   HENT:      Head: Normocephalic and atraumatic.      Nose: Nose normal.      Mouth/Throat:      Mouth: Mucous membranes are dry.      Pharynx: Oropharynx is clear.   Eyes:      Extraocular Movements: Extraocular movements intact.      Conjunctiva/sclera: Conjunctivae normal.   Cardiovascular:      Rate and Rhythm: Normal rate and regular rhythm.      Pulses: Normal pulses.      Heart sounds: Normal heart sounds.   Pulmonary:      Effort: Pulmonary effort is normal.      Breath sounds: Normal breath sounds.   Abdominal:      General: Abdomen is flat. There is no distension.      Tenderness: There is no abdominal tenderness.   Musculoskeletal:         General: No swelling. Normal range of motion.      Cervical back: Normal range of motion.   Skin:     General: Skin is warm and dry.   Neurological:      General: No focal deficit present.      Mental Status: He is alert and oriented to person, place, and time.      Cranial Nerves: No cranial nerve deficit.   Psychiatric:         Mood and  Affect: Mood normal.         Behavior: Behavior normal.         Thought Content: Thought content normal.         Judgment: Judgment normal.           PROCEDURES     Procedures     DATA     Results     Procedure Component Value Units Date/Time    HS Troponin I (with 2 hour reflex) [[id number]]  (Normal) Collected: 11/16/23 1902    Specimen: Blood, Venous Updated: 11/16/23 1953     High Sens Troponin I <2.0 pg/mL     Comprehensive metabolic panel [[id number]]  (Normal) Collected: 11/16/23 1902    Specimen: Blood, Venous Updated: 11/16/23 1947     Sodium 140 mEQ/L      Potassium 4.1 mEQ/L      Comment: Results obtained on plasma. Plasma Potassium values may be up to 0.4 mEQ/L less than serum values. The differences may be greater for patients with high platelet or white cell counts.        Chloride 104 mEQ/L      CO2 28 mEQ/L      BUN 9 mg/dL      Creatinine 0.8 mg/dL      Glucose 88 mg/dL      Calcium 9.5 mg/dL      AST (SGOT) 25 IU/L      ALT (SGPT) 34 IU/L      Alkaline Phosphatase 54 IU/L      Total Protein 7.1 g/dL      Comment: Test performed on plasma which typically contains approximately 0.4 g/dL more protein than serum.        Albumin 4.6 g/dL      Bilirubin, Total 0.6 mg/dL      eGFR >60.0 mL/min/1.73m*2      Anion Gap 8 mEQ/L     CBC and differential [[id number]]  (Abnormal) Collected: 11/16/23 1902    Specimen: Blood, Venous Updated: 11/16/23 1922     WBC 11.85 K/uL      RBC 5.25 M/uL      Hemoglobin 15.7 g/dL      Hematocrit 45.4 %      MCV 86.5 fL      MCH 29.9 pg      MCHC 34.6 g/dL      RDW 11.8 %      Platelets 339 K/uL      MPV 9.0 fL      Differential Type Auto     nRBC 0.0 %      Immature Granulocytes 0.3 %      Neutrophils 49.9 %      Lymphocytes 36.5 %      Monocytes 10.0 %      Eosinophils 2.4 %      Basophils 0.9 %      Immature Granulocytes, Absolute 0.04 K/uL      Neutrophils, Absolute 5.91 K/uL      Lymphocytes, Absolute 4.32 K/uL      Monocytes, Absolute 1.18 K/uL      Eosinophils, Absolute  0.29 K/uL      Basophils, Absolute 0.11 K/uL     RAINBOW RED [[id number]] Collected: 11/16/23 1902    Specimen: Blood, Venous Updated: 11/16/23 1919    RAINBOW LT BLUE [[id number]] Collected: 11/16/23 1902    Specimen: Blood, Venous Updated: 11/16/23 1919    [place] Draw Panel [[id number]] Collected: 11/16/23 1902    Specimen: Blood, Venous Updated: 11/16/23 1919    Narrative:      The following orders were created for panel order [place] Draw Panel.  Procedure                               Abnormality         Status                     ---------                               -----------         ------                     RAINBOW RED[[id number]]                                      In process                 RAINBOW LT BLUE[[id number]]                                  In process                 RAINBOW GOLD[[id number]]                                     In process                   Please view results for these tests on the individual orders.    MUKESH GOLD [[id number]] Collected: 11/16/23 1902    Specimen: Blood, Venous Updated: 11/16/23 1919          Imaging Results          X-RAY CHEST 2 VIEWS (Final result)  Result time 11/16/23 19:58:29    Final result                 Impression:    IMPRESSION: Normal chest.    COMMENT: The current 2 view examination the chest demonstrates the lungs to be  well aerated and clear. The cardiomediastinal silhouette is normal in size and  configuration. The costophrenic sulci and bony thorax are intact.             Narrative:    CLINICAL HISTORY: Palpitations.                                EKG 12 lead   Independent Interpretation by ED Provider   ECG  Rate: 92  Rhythm: sinus  AZ Interval: 128  QRS Duration: 86  QT/QTc: 348/430  ST segment: no acute ischemic changes      Impression: NSR              Scoring tools                                  ED Course & MDM   MDM / ED COURSE / CLINICAL IMPRESSION / DISPO     Medical Decision Making  Patient is a 43-year-old male presenting to the  emergency department for evaluation of 1 episode of palpitations prior to arrival lasting 10 to 15 seconds.  He is asymptomatic while in the ED.  Physical exam benign.  Lab work today reassuring. Cardiac work-up reassuring as well with normal sinus rhythm on EKG, negative troponin x1, no active disease on chest x-ray.  He did state that he wanted to come in today for a cardiologist to see outpatient. He was given contact information to follow up, recommend closely following with cardiology outpatient for possible Holter monitor and stress test as he had a history of this in the past.  He expressed understanding and agreement with this plan and was discharged.  Return precautions addressed.    Amount and/or Complexity of Data Reviewed  Labs: ordered. Decision-making details documented in ED Course.  Radiology: ordered.  ECG/medicine tests: ordered and independent interpretation performed.          ED Course as of 11/20/23 2210   Thu Nov 16, 2023 1934 Impression: 1 episode of palpitations prior to arrival lasting 10 to 15 seconds, otherwise asymptomatic    Plan: Labs, troponin, EKG, chest x-ray, reeval [MO]   1935 WBC(!): 11.85 [MO]   2002 IMPRESSION: Normal chest. [MO]   2002 Patient discussed with Dr. Stephensno [MO]      ED Course User Index  [MO] [place] PA C     Clinical Impression      Palpitations     _________________     ED Disposition   Discharge                   [place] PA C  11/20/23 2210

## 2023-11-17 NOTE — DISCHARGE INSTRUCTIONS
You were seen in the emergency department today for evaluation of palpitations.  Your work-up and imaging today are reassuring.  Recommend close follow-up with cardiology outpatient, please call the number provided in your discharge paperwork.    Please return to the ED with any new or worsening symptoms.

## 2023-11-18 NOTE — ED ATTESTATION NOTE
Procedures  Physical Exam  Review of Systems  [facility]    11/17/20239:23 PM  I have personally seen and examined the patient. I was involved in the care and medical decision making for this patient.    I reviewed and agree with the PA/NP/Resident's assessment and plan of care, with any exceptions as documented below.    My focused history, examination, assessment, and plan of care of Rodolfo Stafford is as follows:    The patient presents with palpitations that are described like a heavy heart feeling of every heartbeat.  Rate was not rapid.  Lasted 10 to 15 seconds during which time there was no lightheadedness or shortness of breath.  Please see PA Trent's note for further details.    Exam: Awake alert and oriented currently no distress.  Lungs are clear.  Heart S1-S2 without any murmur.  Abdomen soft nontender.    Impression/Plan/Medical Decision Making: Unclear what the rhythm was with most likely just a sinus rhythm in a situation where patient was under some stress.  Labs here are completely normal, as well as the chest x-ray and the EKG.  Agree with PA management and discharge.    Vital Signs Review: Vital signs have been reviewed. The oxygen saturation is  SpO2: 96 % which is normal.     This document was created using Dragon dictation software.  There might be some typographical errors due to this technology.    We are in a period of time with increased volumes and decreased capacity.      Rory Stephenson MD  11/17/23 4070

## 2024-05-21 ENCOUNTER — HOSPITAL ENCOUNTER (EMERGENCY)
Facility: HOSPITAL | Age: 44
Discharge: HOME | End: 2024-05-22
Attending: EMERGENCY MEDICINE
Payer: COMMERCIAL

## 2024-05-21 DIAGNOSIS — R07.89 SENSATION OF CHEST PRESSURE: ICD-10-CM

## 2024-05-21 DIAGNOSIS — H92.02 LEFT EAR PAIN: Primary | ICD-10-CM

## 2024-05-21 DIAGNOSIS — R00.2 PALPITATIONS: ICD-10-CM

## 2024-05-21 LAB
ALBUMIN SERPL-MCNC: 4.6 G/DL (ref 3.5–5.7)
ALP SERPL-CCNC: 49 IU/L (ref 34–125)
ALT SERPL-CCNC: 35 IU/L (ref 7–52)
ANION GAP SERPL CALC-SCNC: 7 MEQ/L (ref 3–15)
AST SERPL-CCNC: 28 IU/L (ref 13–39)
BASOPHILS # BLD: 0.11 K/UL (ref 0.01–0.1)
BASOPHILS NFR BLD: 1 %
BILIRUB SERPL-MCNC: 0.4 MG/DL (ref 0.3–1.2)
BUN SERPL-MCNC: 11 MG/DL (ref 7–25)
CALCIUM SERPL-MCNC: 9.5 MG/DL (ref 8.6–10.3)
CHLORIDE SERPL-SCNC: 103 MEQ/L (ref 98–107)
CO2 SERPL-SCNC: 28 MEQ/L (ref 21–31)
CREAT SERPL-MCNC: 0.7 MG/DL (ref 0.7–1.3)
DIFFERENTIAL METHOD BLD: ABNORMAL
EGFRCR SERPLBLD CKD-EPI 2021: >60 ML/MIN/1.73M*2
EOSINOPHIL # BLD: 0.4 K/UL (ref 0.04–0.54)
EOSINOPHIL NFR BLD: 3.7 %
ERYTHROCYTE [DISTWIDTH] IN BLOOD BY AUTOMATED COUNT: 11.8 % (ref 11.6–14.4)
GLUCOSE SERPL-MCNC: 89 MG/DL (ref 70–99)
HCT VFR BLD AUTO: 43.7 % (ref 40.1–51)
HGB BLD-MCNC: 15.2 G/DL (ref 13.7–17.5)
IMM GRANULOCYTES # BLD AUTO: 0.07 K/UL (ref 0–0.08)
IMM GRANULOCYTES NFR BLD AUTO: 0.6 %
LYMPHOCYTES # BLD: 4.26 K/UL (ref 1.2–3.5)
LYMPHOCYTES NFR BLD: 38.9 %
MCH RBC QN AUTO: 29.6 PG (ref 28–33.2)
MCHC RBC AUTO-ENTMCNC: 34.8 G/DL (ref 32.2–36.5)
MCV RBC AUTO: 85 FL (ref 83–98)
MONOCYTES # BLD: 1.33 K/UL (ref 0.3–1)
MONOCYTES NFR BLD: 12.2 %
NEUTROPHILS # BLD: 4.77 K/UL (ref 1.7–7)
NEUTS SEG NFR BLD: 43.6 %
NRBC BLD-RTO: 0 %
PDW BLD AUTO: 8.9 FL (ref 9.4–12.4)
PLATELET # BLD AUTO: 306 K/UL (ref 150–350)
POTASSIUM SERPL-SCNC: 4 MEQ/L (ref 3.5–5.1)
PROT SERPL-MCNC: 7 G/DL (ref 6–8.2)
RBC # BLD AUTO: 5.14 M/UL (ref 4.5–5.8)
SODIUM SERPL-SCNC: 138 MEQ/L (ref 136–145)
TROPONIN I SERPL HS-MCNC: 2.7 PG/ML
WBC # BLD AUTO: 10.94 K/UL (ref 3.8–10.5)

## 2024-05-21 PROCEDURE — 36415 COLL VENOUS BLD VENIPUNCTURE: CPT

## 2024-05-21 PROCEDURE — 93005 ELECTROCARDIOGRAM TRACING: CPT

## 2024-05-21 PROCEDURE — 83735 ASSAY OF MAGNESIUM: CPT

## 2024-05-21 PROCEDURE — 84484 ASSAY OF TROPONIN QUANT: CPT

## 2024-05-21 PROCEDURE — 93005 ELECTROCARDIOGRAM TRACING: CPT | Performed by: EMERGENCY MEDICINE

## 2024-05-21 PROCEDURE — 80053 COMPREHEN METABOLIC PANEL: CPT | Performed by: EMERGENCY MEDICINE

## 2024-05-21 PROCEDURE — 99283 EMERGENCY DEPT VISIT LOW MDM: CPT | Mod: 25

## 2024-05-21 PROCEDURE — 80053 COMPREHEN METABOLIC PANEL: CPT

## 2024-05-21 PROCEDURE — 84484 ASSAY OF TROPONIN QUANT: CPT | Performed by: EMERGENCY MEDICINE

## 2024-05-21 PROCEDURE — 85025 COMPLETE CBC W/AUTO DIFF WBC: CPT

## 2024-05-21 PROCEDURE — 85025 COMPLETE CBC W/AUTO DIFF WBC: CPT | Performed by: EMERGENCY MEDICINE

## 2024-05-21 PROCEDURE — 84443 ASSAY THYROID STIM HORMONE: CPT

## 2024-05-21 RX ORDER — RISPERIDONE 1 MG/1
2 TABLET ORAL DAILY
COMMUNITY
Start: 2024-05-08 | End: 2025-01-11

## 2024-05-21 RX ORDER — MINERAL OIL
ENEMA (ML) RECTAL
COMMUNITY
Start: 2023-12-07 | End: 2024-11-23

## 2024-05-21 RX ORDER — DULOXETIN HYDROCHLORIDE 30 MG/1
30 CAPSULE, DELAYED RELEASE ORAL DAILY
COMMUNITY

## 2024-05-21 RX ORDER — PALIPERIDONE 3 MG/1
TABLET, EXTENDED RELEASE ORAL
COMMUNITY
Start: 2024-05-14 | End: 2024-11-23

## 2024-05-21 RX ORDER — FLUTICASONE PROPIONATE 50 MCG
SPRAY, SUSPENSION (ML) NASAL
COMMUNITY
Start: 2024-05-06 | End: 2024-11-23

## 2024-05-22 ENCOUNTER — APPOINTMENT (EMERGENCY)
Dept: RADIOLOGY | Facility: HOSPITAL | Age: 44
End: 2024-05-22
Payer: COMMERCIAL

## 2024-05-22 VITALS
RESPIRATION RATE: 17 BRPM | HEART RATE: 89 BPM | DIASTOLIC BLOOD PRESSURE: 91 MMHG | SYSTOLIC BLOOD PRESSURE: 142 MMHG | TEMPERATURE: 97.8 F | OXYGEN SATURATION: 96 %

## 2024-05-22 LAB
ATRIAL RATE: 102
MAGNESIUM SERPL-MCNC: 2.2 MG/DL (ref 1.8–2.5)
P AXIS: 27
PR INTERVAL: 132
QRS DURATION: 86
QT INTERVAL: 342
QTC CALCULATION(BAZETT): 445
R AXIS: 18
T WAVE AXIS: 0
TSH SERPL DL<=0.05 MIU/L-ACNC: 3.63 MIU/L (ref 0.34–5.6)
VENTRICULAR RATE: 102

## 2024-05-22 PROCEDURE — 93010 ELECTROCARDIOGRAM REPORT: CPT | Performed by: STUDENT IN AN ORGANIZED HEALTH CARE EDUCATION/TRAINING PROGRAM

## 2024-05-22 PROCEDURE — 71046 X-RAY EXAM CHEST 2 VIEWS: CPT

## 2024-06-27 ENCOUNTER — TRANSCRIBE ORDERS (OUTPATIENT)
Dept: SCHEDULING | Age: 44
End: 2024-06-27

## 2024-06-27 DIAGNOSIS — R03.0 ELEVATED BLOOD-PRESSURE READING, WITHOUT DIAGNOSIS OF HYPERTENSION: ICD-10-CM

## 2024-08-28 ENCOUNTER — TRANSCRIBE ORDERS (OUTPATIENT)
Dept: SCHEDULING | Age: 44
End: 2024-08-28

## 2024-08-28 DIAGNOSIS — H91.8X2 OTHER SPECIFIED HEARING LOSS, LEFT EAR: Primary | ICD-10-CM

## 2024-11-23 ENCOUNTER — APPOINTMENT (EMERGENCY)
Dept: RADIOLOGY | Facility: HOSPITAL | Age: 44
End: 2024-11-23
Payer: COMMERCIAL

## 2024-11-23 ENCOUNTER — HOSPITAL ENCOUNTER (EMERGENCY)
Facility: HOSPITAL | Age: 44
Discharge: HOME | End: 2024-11-23
Attending: EMERGENCY MEDICINE | Admitting: EMERGENCY MEDICINE
Payer: COMMERCIAL

## 2024-11-23 VITALS
DIASTOLIC BLOOD PRESSURE: 79 MMHG | HEIGHT: 74 IN | OXYGEN SATURATION: 96 % | SYSTOLIC BLOOD PRESSURE: 128 MMHG | TEMPERATURE: 98.5 F | RESPIRATION RATE: 18 BRPM | HEART RATE: 84 BPM | BODY MASS INDEX: 31.95 KG/M2 | WEIGHT: 249 LBS

## 2024-11-23 DIAGNOSIS — F20.9 SCHIZOPHRENIA, UNSPECIFIED TYPE: Primary | ICD-10-CM

## 2024-11-23 DIAGNOSIS — R44.0 AUDITORY HALLUCINATIONS: ICD-10-CM

## 2024-11-23 DIAGNOSIS — F22 DELUSIONS (CMS/HCC): ICD-10-CM

## 2024-11-23 LAB
ALBUMIN SERPL-MCNC: 4.6 G/DL (ref 3.5–5.7)
ALP SERPL-CCNC: 56 IU/L (ref 34–125)
ALT SERPL-CCNC: 21 IU/L (ref 7–52)
AMPHET UR QL SCN: NOT DETECTED
ANION GAP SERPL CALC-SCNC: 7 MEQ/L (ref 3–15)
APAP SERPL-MCNC: 0.2 UG/ML (ref 10–30)
AST SERPL-CCNC: 19 IU/L (ref 13–39)
BARBITURATES UR QL SCN: NOT DETECTED
BASOPHILS # BLD: 0.11 K/UL (ref 0.01–0.1)
BASOPHILS NFR BLD: 1 %
BENZODIAZ UR QL SCN: NOT DETECTED
BILIRUB SERPL-MCNC: 0.5 MG/DL (ref 0.3–1.2)
BILIRUB UR QL STRIP.AUTO: NEGATIVE MG/DL
BUN SERPL-MCNC: 8 MG/DL (ref 7–25)
CALCIUM SERPL-MCNC: 9.4 MG/DL (ref 8.6–10.3)
CANNABINOIDS UR QL SCN: NOT DETECTED
CHLORIDE SERPL-SCNC: 103 MEQ/L (ref 98–107)
CLARITY UR REFRACT.AUTO: CLEAR
CO2 SERPL-SCNC: 27 MEQ/L (ref 21–31)
COCAINE UR QL SCN: NOT DETECTED
COLOR UR AUTO: COLORLESS
CREAT SERPL-MCNC: 0.8 MG/DL (ref 0.7–1.3)
DIFFERENTIAL METHOD BLD: ABNORMAL
EGFRCR SERPLBLD CKD-EPI 2021: >60 ML/MIN/1.73M*2
EOSINOPHIL # BLD: 0.37 K/UL (ref 0.04–0.54)
EOSINOPHIL NFR BLD: 3.2 %
ERYTHROCYTE [DISTWIDTH] IN BLOOD BY AUTOMATED COUNT: 11.6 % (ref 11.6–14.4)
ETHANOL SERPL-MCNC: <10 MG/DL
FENTANYL URINE SCR: NOT DETECTED
GLUCOSE SERPL-MCNC: 120 MG/DL (ref 70–99)
GLUCOSE UR STRIP.AUTO-MCNC: NEGATIVE MG/DL
HCT VFR BLD AUTO: 41.4 % (ref 40.1–51)
HGB BLD-MCNC: 14.6 G/DL (ref 13.7–17.5)
HGB UR QL STRIP.AUTO: NEGATIVE
IMM GRANULOCYTES # BLD AUTO: 0.07 K/UL (ref 0–0.08)
IMM GRANULOCYTES NFR BLD AUTO: 0.6 %
KETONES UR STRIP.AUTO-MCNC: NEGATIVE MG/DL
LEUKOCYTE ESTERASE UR QL STRIP.AUTO: NEGATIVE
LYMPHOCYTES # BLD: 4.12 K/UL (ref 1.2–3.5)
LYMPHOCYTES NFR BLD: 36.2 %
MAGNESIUM SERPL-MCNC: 1.9 MG/DL (ref 1.8–2.5)
MCH RBC QN AUTO: 29.6 PG (ref 28–33.2)
MCHC RBC AUTO-ENTMCNC: 35.3 G/DL (ref 32.2–36.5)
MCV RBC AUTO: 84 FL (ref 83–98)
MONOCYTES # BLD: 1.23 K/UL (ref 0.3–1)
MONOCYTES NFR BLD: 10.8 %
NEUTROPHILS # BLD: 5.49 K/UL (ref 1.7–7)
NEUTS SEG NFR BLD: 48.2 %
NITRITE UR QL STRIP.AUTO: NEGATIVE
NRBC BLD-RTO: 0 %
OPIATES UR QL SCN: NOT DETECTED
PCP UR QL SCN: NOT DETECTED
PH UR STRIP.AUTO: 6.5 [PH]
PLATELET # BLD AUTO: 284 K/UL (ref 150–350)
PMV BLD AUTO: 9.2 FL (ref 9.4–12.4)
POTASSIUM SERPL-SCNC: 3.6 MEQ/L (ref 3.5–5.1)
PROT SERPL-MCNC: 7 G/DL (ref 6–8.2)
PROT UR QL STRIP.AUTO: NEGATIVE
RBC # BLD AUTO: 4.93 M/UL (ref 4.5–5.8)
SALICYLATES SERPL-MCNC: <1.5 MG/DL
SODIUM SERPL-SCNC: 137 MEQ/L (ref 136–145)
SP GR UR REFRACT.AUTO: 1.01
TROPONIN I SERPL HS-MCNC: <2 PG/ML
UROBILINOGEN UR STRIP-ACNC: 0.2 EU/DL
WBC # BLD AUTO: 11.39 K/UL (ref 3.8–10.5)

## 2024-11-23 PROCEDURE — 80053 COMPREHEN METABOLIC PANEL: CPT

## 2024-11-23 PROCEDURE — G0480 DRUG TEST DEF 1-7 CLASSES: HCPCS

## 2024-11-23 PROCEDURE — 99284 EMERGENCY DEPT VISIT MOD MDM: CPT | Mod: 25

## 2024-11-23 PROCEDURE — 83735 ASSAY OF MAGNESIUM: CPT

## 2024-11-23 PROCEDURE — 87491 CHLMYD TRACH DNA AMP PROBE: CPT

## 2024-11-23 PROCEDURE — 93005 ELECTROCARDIOGRAM TRACING: CPT

## 2024-11-23 PROCEDURE — 87591 N.GONORRHOEAE DNA AMP PROB: CPT

## 2024-11-23 PROCEDURE — 81003 URINALYSIS AUTO W/O SCOPE: CPT | Mod: 59

## 2024-11-23 PROCEDURE — 36415 COLL VENOUS BLD VENIPUNCTURE: CPT

## 2024-11-23 PROCEDURE — 71045 X-RAY EXAM CHEST 1 VIEW: CPT

## 2024-11-23 PROCEDURE — 85025 COMPLETE CBC W/AUTO DIFF WBC: CPT

## 2024-11-23 PROCEDURE — 80307 DRUG TEST PRSMV CHEM ANLYZR: CPT

## 2024-11-23 PROCEDURE — 84484 ASSAY OF TROPONIN QUANT: CPT

## 2024-11-23 RX ORDER — DOXEPIN 3 MG/1
1.5 TABLET, FILM COATED ORAL NIGHTLY
COMMUNITY

## 2024-11-23 ASSESSMENT — ENCOUNTER SYMPTOMS
DYSPHORIC MOOD: 0
SLEEP DISTURBANCE: 0
FEVER: 0
COUGH: 0
VOMITING: 0
DIFFICULTY URINATING: 0
NECK PAIN: 0
SEIZURES: 0
ABDOMINAL PAIN: 0
NERVOUS/ANXIOUS: 1
DYSURIA: 0
ACTIVITY CHANGE: 0
SHORTNESS OF BREATH: 0
HYPERACTIVE: 0
HEADACHES: 0
NAUSEA: 0
WEAKNESS: 0
COLOR CHANGE: 0
HALLUCINATIONS: 1
DIARRHEA: 0
AGITATION: 0
CONFUSION: 0
BACK PAIN: 0

## 2024-11-24 LAB
ATRIAL RATE: 80
P AXIS: 45
PR INTERVAL: 134
QRS DURATION: 86
QT INTERVAL: 374
QTC CALCULATION(BAZETT): 431
R AXIS: 30
T WAVE AXIS: 61
VENTRICULAR RATE: 80

## 2024-11-24 NOTE — ED NOTES
Pt given discharge paperwork. Pt informed to follow up with PCP as soon as possible. Pt was given further resources by Psych. Pt informed to return to the ED if symptoms continue or become worse at any time. No further questions at this time.      Mingo Harris RN  11/23/24 9648

## 2024-11-24 NOTE — ED PROVIDER NOTES
"  HPI    Chief Complaint   Patient presents with    Hallucinations        HPI   Patient is 68-year-old male with past medical history of anxiety, depression, schizophrenia presenting for evaluation for a \"checkup\" after concern for someone coming into his home overnight last night.  The patient has schizophrenia and has hallucinations and delusions, notes that this morning his desk items were not where he remembers having placed them and was worried that someone came into his home overnight.  He had nonspecific sharp chest pain last night but none today.  He has been hearing voices saying that he is not safe and saying bad things about his neighbors.  He notes specifically his 1 neighbor has been \"harassing me by saying hi to me and she manipulates the men in our development\".  He has command hallucinations that tell him to report people to ice including his neighbors however denies any violent command hallucinations, SI, HI.  He has never been inpatient in a psychiatric facility for his schizophrenia as he follows very closely with his psychiatrist Marla Edge from [facility] and is compliant with his medications.  He has done partial hospitalization programs in the past but feels that he is not at the point where he would need that currently.  He has visual and auditory hallucinations at baseline, intermittent tactile and olfactory hallucinations but none currently.  He is a non-smoker and denies any previous stent placement, does not follow with cardiology.  He denies any recent medication changes.  He does not have access to firearms.      Past Medical History:   Diagnosis Date    Anxiety     Depression     Schizophrenia (CMS/[facility])          Past Surgical History   Procedure Laterality Date    Tonsillectomy      [place] tooth extraction  2012    All 4       Family History   Problem Relation Name Age of Onset    Hyperlipidemia Biological Mother      Stroke Biological Mother      Depression Biological " "Mother      Suicide Attempts Biological Mother      Asthma Biological Mother      Arthritis Biological Mother         Social History     Tobacco Use    Smoking status: Never    Smokeless tobacco: Never   Vaping Use    Vaping status: Never Used   Substance Use Topics    Alcohol use: Not Currently    Drug use: Never       Systems Reviewed from Nursing Triage:               Review of Systems   Constitutional:  Negative for activity change and fever.   Respiratory:  Negative for cough and shortness of breath.    Cardiovascular:  Positive for chest pain. Negative for leg swelling.   Gastrointestinal:  Negative for abdominal pain, diarrhea, nausea and vomiting.   Genitourinary:  Negative for difficulty urinating and dysuria.   Musculoskeletal:  Negative for back pain and neck pain.   Skin:  Negative for color change.   Neurological:  Negative for seizures, syncope, weakness and headaches.   Psychiatric/Behavioral:  Positive for hallucinations. Negative for agitation, behavioral problems, confusion, dysphoric mood, self-injury, sleep disturbance and suicidal ideas. The patient is nervous/anxious. The patient is not hyperactive.             ED Triage Vitals [11/23/24 2000]   Temp Heart Rate Resp BP SpO2   37 °C (98.6 °F) 93 16 (!) 128/97 100 %      Temp Source Heart Rate Source Patient Position BP Location FiO2 (%) (Set)   Temporal -- Sitting Right upper arm --       Vitals:    11/23/24 2000 11/23/24 2145 11/23/24 2249   BP: (!) 128/97 127/80 128/79   BP Location: Right upper arm Right upper arm Right upper arm   Patient Position: Sitting  Lying   Pulse: 93 86 84   Resp: 16 18 18   Temp: 37 °C (98.6 °F)  36.9 °C (98.5 °F)   TempSrc: Temporal  Temporal   SpO2: 100% 96%    Weight: 113 kg (249 lb)     Height: 1.88 m (6' 2\")                           Physical Exam  Vitals and nursing note reviewed.   Constitutional:       General: He is not in acute distress.     Appearance: He is well-developed. He is not ill-appearing. "   HENT:      Head: Normocephalic and atraumatic.   Eyes:      Conjunctiva/sclera: Conjunctivae normal.   Cardiovascular:      Rate and Rhythm: Normal rate and regular rhythm.      Pulses: Normal pulses.      Heart sounds: Normal heart sounds.   Pulmonary:      Effort: Pulmonary effort is normal.      Breath sounds: Normal breath sounds.   Abdominal:      General: There is no distension.      Palpations: Abdomen is soft. There is no mass.      Tenderness: There is no abdominal tenderness.   Musculoskeletal:         General: No tenderness or deformity. Normal range of motion.      Cervical back: Normal range of motion.   Skin:     General: Skin is warm and dry.      Capillary Refill: Capillary refill takes less than 2 seconds.   Neurological:      General: No focal deficit present.      Mental Status: He is alert and oriented to person, place, and time. Mental status is at baseline.   Psychiatric:         Attention and Perception: He is attentive. He perceives auditory and visual hallucinations.         Mood and Affect: Mood is anxious.         Speech: Speech normal.         Behavior: Behavior normal. Behavior is cooperative.         Thought Content: Thought content is delusional. Thought content does not include homicidal or suicidal ideation. Thought content does not include homicidal or suicidal plan.         Cognition and Memory: Cognition and memory normal.         Judgment: Judgment normal.              X-RAY CHEST 1 VIEW   Final Result   IMPRESSION: No acute disease in the chest.      ECG 12 lead   ED Interpretation   Rhythm: Normal Sinus with arrhythmia  Rate: 80  P waves: normal  QRS: normal  Axis: normal  ST Segments: no obvious ST elevation or ischemia    Rhythm Strip: NSR    O2 sat: normal            Labs Reviewed   CBC AND DIFF - Abnormal       Result Value    WBC 11.39 (*)     RBC 4.93      Hemoglobin 14.6      Hematocrit 41.4      MCV 84.0      MCH 29.6      MCHC 35.3      RDW 11.6      Platelets 284       MPV 9.2 (*)     Differential Type Auto      nRBC 0.0      Immature Granulocytes 0.6      Neutrophils 48.2      Lymphocytes 36.2      Monocytes 10.8      Eosinophils 3.2      Basophils 1.0      Immature Granulocytes, Absolute 0.07      Neutrophils, Absolute 5.49      Lymphocytes, Absolute 4.12 (*)     Monocytes, Absolute 1.23 (*)     Eosinophils, Absolute 0.37      Basophils, Absolute 0.11 (*)    COMPREHENSIVE METABOLIC PANEL - Abnormal    Sodium 137      Potassium 3.6      Chloride 103      CO2 27      BUN 8      Creatinine 0.8      Glucose 120 (*)     Calcium 9.4      AST (SGOT) 19      ALT (SGPT) 21      Alkaline Phosphatase 56      Total Protein 7.0      Albumin 4.6      Bilirubin, Total 0.5      eGFR >60.0      Anion Gap 7     ER TOXICOLOGY SCR, SERUM - Abnormal    Salicylate <1.5      Acetaminophen 0.2 (*)     Ethanol <10     HIGH SENSITIVE TROPONIN I (BASELINE - REFLEX 2HR) - Normal    High Sens Troponin I <2.0     MAGNESIUM - Normal    Magnesium 1.9     DRUG SCREEN PANEL, URINE - Normal    PCP Scrn, Ur Not Detected      Benzodiazepine Ur Qual Not Detected      Cocaine Screen, Urine Not Detected      Amphetamine+Methamphetamine Screen, Ur Not Detected      Cannabinoid Screen, Urine Not Detected      Opiate Scrn, Ur Not Detected      Barbiturate Screen, Ur Not Detected      Fentanyl Screen, Urine Not Detected     UA REFLEX CULTURE (MACROSCOPIC) - Normal    Color, Urine Colorless      Clarity, Urine Clear      Specific Gravity, Urine 1.007      pH, Urine 6.5      Leukocyte Esterase Negative      Nitrite, Urine Negative      Protein, Urine Negative      Glucose, Urine Negative      Ketones, Urine Negative      Urobilinogen, Urine 0.2      Bilirubin, Urine Negative      Blood, Urine Negative     CHLAMYDIA/GC RNA:THINPREP/URINE/SWAB   URINALYSIS REFLEX CULTURE (ED AND OUTPATIENT ONLY)    Narrative:     The following orders were created for panel order UA w/ reflex culture (ED Only).  Procedure                                Abnormality         Status                     ---------                               -----------         ------                     UA Reflex to Culture (Ma...[[id number]]  Normal              Final result                 Please view results for these tests on the individual orders.       X-RAY CHEST 1 VIEW   Final Result   IMPRESSION: No acute disease in the chest.      ECG 12 lead   ED Interpretation   Rhythm: Normal Sinus with arrhythmia  Rate: 80  P waves: normal  QRS: normal  Axis: normal  ST Segments: no obvious ST elevation or ischemia    Rhythm Strip: NSR    O2 sat: normal              Procedures    Final diagnoses:   [F20.9] Schizophrenia, unspecified type (CMS/HCC)   [R44.0] Auditory hallucinations   [F22] Delusions (CMS/HCC)       ED Course & [facility]     ED Course as of 11/24/24 0030   Sat Nov 23, 2024 2118 X-RAY CHEST 1 VIEW  IMPRESSION: No acute disease in the chest. [SW]   2127 Workup thus far reassuring, awaiting urine studies. [SW]   2216 Spoke with Crisis who will see the patient. [SW]   2246 Crisis saw the patient and is giving him resources. I have reviewed the results of the studies performed today with the patient.  I have discussed outpatient follow-up.  The patient is understanding and agreeable with the plan and is comfortable with discharge.  Ambulating with a steady gait at this time.  Strict return instructions given. He feels safe going home now that he had had a medical evaluation. [SW]      ED Course User Index  [SW] Rosy Parra CRNP           Medical Decision Making  Problems Addressed:  Auditory hallucinations: acute illness or injury  Delusions (CMS/HCC): acute illness or injury  Schizophrenia, unspecified type (CMS/HCC): acute illness or injury    Amount and/or Complexity of Data Reviewed  External Data Reviewed: labs, radiology and notes.  Labs: ordered. Decision-making details documented in ED Course.  Radiology: ordered. Decision-making details documented in ED  Course.  ECG/medicine tests: ordered and independent interpretation performed.        12:30 AM      Impression/Medical Decision Making: Hallucinations and delusions, concern significant that someone broke into his home last evening    Plan: Labs, EKG, CXR, UA, UDS, STI testing per patient's request, crisis evaluation, observe    Vital Signs Review: Vital signs have been reviewed. The oxygen saturation is  SpO2: 100 % which is normal.      MAXWELL Suazo  11/24/2024      We are in a period of time with increased volumes and decreased capacity.     This document was created using dragon dictation software.  There might be some typographical errors due to this technology.       Rosy Parra CRNP  11/24/24 0030

## 2024-11-24 NOTE — ED ATTESTATION NOTE
Procedures  Physical Exam  Review of Systems    11/24/20241:11 AM  I have personally seen and examined the patient.  I reviewed and agree with the PA/NP/Resident's assessment and plan of care.    My examination, assessment, and plan of care of Rodolfo Stafford is as follows:  The patient presents with history of schizophrenia and he presents to the emergency department with anxiety.  He states that he recently lost his mother whom he lives with.  He now lives in his home on his own.  He states he woke up this morning and found a part of his computer keyboard was broken.  He is concerned for his safety as he feels someone could have been in his home and broke the keyboard.  He emailed the police but has not received a response from them.  He came to the emergency department concerned that perhaps he was sleepwalking or someone broken.  Exam: The patient is alert in no acute distress.  He is neurologically intact and mentating normally.  Impression/Plan: The patient was seen by the our behavioral health specialist and we all felt that he was okay to go home.  We are recommending resources and also recommending that he follow-up with the local police if he is concerned that somebody could be entering his home.  He was given strict return precautions.    Vital Sign Review: Vital signs have been ordered and reviewed. The oxygen saturation is 100% on room air, normal    MDM     I was physically present for the key/critical portions of the following procedures: None    This document was created using dragon dictation software.  There might be some typographical errors due to this technology.     Patricio Peterson, DO  11/24/24 0113

## 2024-11-24 NOTE — BEHAVIORAL HEALTH CRISIS PROGRESS NOTE
10:50 PM- [facility] met Pt at bedside at the request of treatment team. Pt was alert and oriented. Pt denied SI,HI, AVH, SIB, and access to firearm. Pt is a 45yo male who recently lost his mother and currently resides in the home where she lived. Pt shared he was home asleep and when he woke there was a broke object on his computer table that caused him to believe someone was in his house. Pt further shared he has a telehealth appointment this coming week at [facility]. Pt was provide the peer run hotline and cleared from a crisis standpoint.

## 2024-11-25 LAB
C TRACH RRNA SPEC QL NAA+PROBE: NEGATIVE
N GONORRHOEA RRNA SPEC QL NAA+PROBE: NEGATIVE

## 2024-12-13 ENCOUNTER — TRANSCRIBE ORDERS (OUTPATIENT)
Dept: SCHEDULING | Age: 44
End: 2024-12-13

## 2024-12-13 DIAGNOSIS — H91.8X2 OTHER SPECIFIED HEARING LOSS, LEFT EAR: Primary | ICD-10-CM

## 2024-12-21 ENCOUNTER — HOSPITAL ENCOUNTER (OUTPATIENT)
Dept: RADIOLOGY | Facility: HOSPITAL | Age: 44
Discharge: HOME | End: 2024-12-21
Attending: PHYSICIAN ASSISTANT
Payer: COMMERCIAL

## 2024-12-21 VITALS — WEIGHT: 249 LBS | BODY MASS INDEX: 31.97 KG/M2

## 2024-12-21 DIAGNOSIS — H91.8X2 OTHER SPECIFIED HEARING LOSS, LEFT EAR: ICD-10-CM

## 2024-12-21 RX ORDER — GADOBUTROL 604.72 MG/ML
0.1 INJECTION INTRAVENOUS ONCE
Status: COMPLETED | OUTPATIENT
Start: 2024-12-21 | End: 2024-12-21

## 2024-12-21 RX ADMIN — GADOBUTROL 11.5 ML: 604.72 INJECTION INTRAVENOUS at 14:39

## 2025-01-11 ENCOUNTER — APPOINTMENT (EMERGENCY)
Dept: RADIOLOGY | Facility: HOSPITAL | Age: 45
End: 2025-01-11
Payer: COMMERCIAL

## 2025-01-11 ENCOUNTER — HOSPITAL ENCOUNTER (EMERGENCY)
Facility: HOSPITAL | Age: 45
Discharge: HOME | End: 2025-01-11
Attending: EMERGENCY MEDICINE | Admitting: EMERGENCY MEDICINE
Payer: COMMERCIAL

## 2025-01-11 VITALS
TEMPERATURE: 99.3 F | HEIGHT: 74 IN | SYSTOLIC BLOOD PRESSURE: 125 MMHG | RESPIRATION RATE: 21 BRPM | OXYGEN SATURATION: 93 % | HEART RATE: 79 BPM | BODY MASS INDEX: 31.06 KG/M2 | WEIGHT: 242 LBS | DIASTOLIC BLOOD PRESSURE: 78 MMHG

## 2025-01-11 DIAGNOSIS — R07.9 CHEST PAIN, UNSPECIFIED TYPE: Primary | ICD-10-CM

## 2025-01-11 LAB
ALBUMIN SERPL-MCNC: 4.5 G/DL (ref 3.5–5.7)
ALP SERPL-CCNC: 60 IU/L (ref 34–125)
ALT SERPL-CCNC: 24 IU/L (ref 7–52)
ANION GAP SERPL CALC-SCNC: 8 MEQ/L (ref 3–15)
AST SERPL-CCNC: 22 IU/L (ref 13–39)
BASOPHILS # BLD: 0.1 K/UL (ref 0.01–0.1)
BASOPHILS NFR BLD: 0.8 %
BILIRUB SERPL-MCNC: 0.5 MG/DL (ref 0.3–1.2)
BUN SERPL-MCNC: 14 MG/DL (ref 7–25)
CALCIUM SERPL-MCNC: 9.5 MG/DL (ref 8.6–10.3)
CHLORIDE SERPL-SCNC: 102 MEQ/L (ref 98–107)
CO2 SERPL-SCNC: 27 MEQ/L (ref 21–31)
CREAT SERPL-MCNC: 0.8 MG/DL (ref 0.7–1.3)
DIFFERENTIAL METHOD BLD: ABNORMAL
EGFRCR SERPLBLD CKD-EPI 2021: >60 ML/MIN/1.73M*2
EOSINOPHIL # BLD: 0.29 K/UL (ref 0.04–0.54)
EOSINOPHIL NFR BLD: 2.4 %
ERYTHROCYTE [DISTWIDTH] IN BLOOD BY AUTOMATED COUNT: 11.9 % (ref 11.6–14.4)
FLUAV RNA SPEC QL NAA+PROBE: NEGATIVE
FLUBV RNA SPEC QL NAA+PROBE: NEGATIVE
GLUCOSE SERPL-MCNC: 113 MG/DL (ref 70–99)
HCT VFR BLD AUTO: 42.3 % (ref 40.1–51)
HGB BLD-MCNC: 15 G/DL (ref 13.7–17.5)
IMM GRANULOCYTES # BLD AUTO: 0.06 K/UL (ref 0–0.08)
IMM GRANULOCYTES NFR BLD AUTO: 0.5 %
LYMPHOCYTES # BLD: 4.51 K/UL (ref 1.2–3.5)
LYMPHOCYTES NFR BLD: 37.1 %
MCH RBC QN AUTO: 30.2 PG (ref 28–33.2)
MCHC RBC AUTO-ENTMCNC: 35.5 G/DL (ref 32.2–36.5)
MCV RBC AUTO: 85.1 FL (ref 83–98)
MONOCYTES # BLD: 1.16 K/UL (ref 0.3–1)
MONOCYTES NFR BLD: 9.5 %
NEUTROPHILS # BLD: 6.04 K/UL (ref 1.7–7)
NEUTS SEG NFR BLD: 49.7 %
NRBC BLD-RTO: 0 %
PLATELET # BLD AUTO: 321 K/UL (ref 150–350)
PMV BLD AUTO: 8.9 FL (ref 9.4–12.4)
POTASSIUM SERPL-SCNC: 4 MEQ/L (ref 3.5–5.1)
PROT SERPL-MCNC: 7 G/DL (ref 6–8.2)
RBC # BLD AUTO: 4.97 M/UL (ref 4.5–5.8)
RSV RNA SPEC QL NAA+PROBE: NEGATIVE
SARS-COV-2 RNA RESP QL NAA+PROBE: NEGATIVE
SODIUM SERPL-SCNC: 137 MEQ/L (ref 136–145)
TROPONIN I SERPL HS-MCNC: 4.9 PG/ML
TROPONIN I SERPL HS-MCNC: 5 PG/ML
WBC # BLD AUTO: 12.16 K/UL (ref 3.8–10.5)

## 2025-01-11 PROCEDURE — 99283 EMERGENCY DEPT VISIT LOW MDM: CPT | Mod: 25

## 2025-01-11 PROCEDURE — 93005 ELECTROCARDIOGRAM TRACING: CPT | Performed by: EMERGENCY MEDICINE

## 2025-01-11 PROCEDURE — 71046 X-RAY EXAM CHEST 2 VIEWS: CPT

## 2025-01-11 PROCEDURE — 36415 COLL VENOUS BLD VENIPUNCTURE: CPT

## 2025-01-11 PROCEDURE — 85025 COMPLETE CBC W/AUTO DIFF WBC: CPT | Performed by: EMERGENCY MEDICINE

## 2025-01-11 PROCEDURE — 87637 SARSCOV2&INF A&B&RSV AMP PRB: CPT

## 2025-01-11 PROCEDURE — 93005 ELECTROCARDIOGRAM TRACING: CPT

## 2025-01-11 PROCEDURE — 80053 COMPREHEN METABOLIC PANEL: CPT | Performed by: EMERGENCY MEDICINE

## 2025-01-11 PROCEDURE — 87637 SARSCOV2&INF A&B&RSV AMP PRB: CPT | Performed by: EMERGENCY MEDICINE

## 2025-01-11 PROCEDURE — 80053 COMPREHEN METABOLIC PANEL: CPT

## 2025-01-11 PROCEDURE — 84484 ASSAY OF TROPONIN QUANT: CPT | Mod: 91 | Performed by: EMERGENCY MEDICINE

## 2025-01-11 PROCEDURE — 84484 ASSAY OF TROPONIN QUANT: CPT | Performed by: EMERGENCY MEDICINE

## 2025-01-11 PROCEDURE — 84484 ASSAY OF TROPONIN QUANT: CPT

## 2025-01-11 PROCEDURE — 85025 COMPLETE CBC W/AUTO DIFF WBC: CPT

## 2025-01-11 RX ORDER — RISPERIDONE 2 MG/1
2 TABLET ORAL
COMMUNITY

## 2025-01-12 LAB
ATRIAL RATE: 90
P AXIS: 46
PR INTERVAL: 134
QRS DURATION: 86
QT INTERVAL: 360
QTC CALCULATION(BAZETT): 440
R AXIS: 43
T WAVE AXIS: 63
VENTRICULAR RATE: 90

## 2025-01-12 ASSESSMENT — ENCOUNTER SYMPTOMS
DIZZINESS: 0
SHORTNESS OF BREATH: 0
ABDOMINAL PAIN: 0
MYALGIAS: 0
NAUSEA: 0
DIAPHORESIS: 0
LIGHT-HEADEDNESS: 0
CHILLS: 0
WEAKNESS: 0
PALPITATIONS: 0
FEVER: 0
VOMITING: 0
BACK PAIN: 0
NUMBNESS: 0

## 2025-01-12 NOTE — ED PROVIDER NOTES
"Emergency Medicine Note  HPI   HISTORY OF PRESENT ILLNESS       History provided by:  Patient   used: No      45 y/o M with PMH of anxiety, depression, schizophrenia presents to the ED via private vehicle for evaluation of chest pain.  Patient reports that he has a history of intermittent palpitations and chest pain for which he has been previously seen by cardiology with unremarkable echocardiogram and stress test.  Patient has never had cardiac catheterization.  Over last 2 to 3 days, he has noted more frequent episodes.  Today, he had a more \"intense episode that lasted longer\" and went across his chest diffusely.  As this was different, he came to the ER for evaluation.  He arrives here feeling improved.  He is currently pain-free.  He denies any associated shortness of breath, palpitations, leg pain, leg swelling, lightness, dizziness, fatigue, nausea, vomiting, diaphoresis, fevers, back pain, numbness weakness the upper lower extremities.  No syncope.    During triage, patient had stated that he did not feel safe at home due to auditory hallucinations.  Upon further questioning.  Patient reports that he has had these auditory hallucinations present since 2008.  They have been relatively well-controlled on medications.  He has been taking all medications as prescribed.  He notes that he did live with his mother who passed away 3 months ago.  As he is now living alone, he feels like he he has pain attention more to the voices.  He states that these voices have been telling him that his neighbors have bottles upstairs.  He notes that he understands that this is not real.  He does feel safe as it is \"my home.\"  He denies any SI or HI.  No visual hallucinations.  No prior suicide attempt.  He does follow with a psychiatrist and therapist.  He feels like he does have adequate support.    PCP-Nalini  Cardiology-Will        Patient History   PAST HISTORY     Reviewed from Nursing Triage:  Tobacco  " Allergies  Meds  Problems  Med Hx  Surg Hx  Fam Hx  Soc   Hx      Past Medical History:   Diagnosis Date    Anxiety     Depression     Schizophrenia (CMS/[facility])        Past Surgical History   Procedure Laterality Date    Tonsillectomy      [place] tooth extraction  2012    All 4       Family History   Problem Relation Name Age of Onset    Hyperlipidemia Biological Mother      Stroke Biological Mother      Depression Biological Mother      Suicide Attempts Biological Mother      Asthma Biological Mother      Arthritis Biological Mother         Social History     Tobacco Use    Smoking status: Never    Smokeless tobacco: Never   Vaping Use    Vaping status: Never Used   Substance Use Topics    Alcohol use: Not Currently    Drug use: Never         Review of Systems   REVIEW OF SYSTEMS     Review of Systems   Constitutional:  Negative for chills, diaphoresis and fever.   Respiratory:  Negative for shortness of breath.    Cardiovascular:  Positive for chest pain. Negative for palpitations and leg swelling.   Gastrointestinal:  Negative for abdominal pain, nausea and vomiting.   Musculoskeletal:  Negative for back pain and myalgias.   Skin:  Negative for rash.   Neurological:  Negative for dizziness, weakness, light-headedness and numbness.         VITALS     ED Vitals      Date/Time Temp Pulse Resp BP SpO2 [facility]   01/11/25 2200 -- 79 21 125/78 93 %    01/11/25 2130 -- 93 20 146/92 96 %    01/11/25 2100 -- 87 29 156/82 97 %    01/11/25 1915 37.4 °C (99.3 °F) 97 18 113/87 97 % NLM          Pulse Ox %: 97 % (01/11/25 1915)  Pulse Ox Interpretation: Normal (01/11/25 1915)  Heart Rate: 97 (01/11/25 1915)        Physical Exam   PHYSICAL EXAM     Physical Exam  Vitals and nursing note reviewed.   Constitutional:       General: He is not in acute distress.     Appearance: He is well-developed.   HENT:      Head: Normocephalic and atraumatic.      Nose: Nose normal.   Cardiovascular:      Rate and Rhythm: Normal rate and  regular rhythm.      Heart sounds: Normal heart sounds.   Pulmonary:      Effort: Pulmonary effort is normal.      Breath sounds: Normal breath sounds.   Chest:      Chest wall: No tenderness.   Abdominal:      Palpations: Abdomen is soft.      Tenderness: There is no abdominal tenderness.   Musculoskeletal:         General: Normal range of motion.      Cervical back: Normal range of motion and neck supple.      Right lower leg: No tenderness. No edema.      Left lower leg: No tenderness. No edema.      Comments: No lower extremity edema or calf tenderness bilaterally.   Skin:     General: Skin is warm and dry.      Findings: No rash.   Neurological:      Mental Status: He is alert and oriented to person, place, and time.   Psychiatric:         Attention and Perception: He perceives auditory hallucinations. He does not perceive visual hallucinations.         Mood and Affect: Mood normal.         Speech: Speech normal.         Behavior: Behavior is cooperative.         Thought Content: Thought content does not include homicidal or suicidal ideation. Thought content does not include homicidal or suicidal plan.           PROCEDURES     Procedures     DATA     Results       Procedure Component Value Units Date/Time    SARS-COV-2 (COVID-19)/ FLU A/B, AND RSV, PCR Nasopharynx [[id number]]  (Normal) Collected: 01/11/25 1921    Specimen: Nasopharyngeal Swab from Nasopharynx Updated: 01/11/25 2007     SARS-CoV-2 (COVID-19) Negative     Influenza A Negative     Influenza B Negative     Respiratory Syncytial Virus Negative    Narrative:      Testing performed using real-time PCR for detection of COVID-19. EUA approved validation studies performed on site.     HS Troponin I (with 2 hour reflex) [[id number]]  (Normal) Collected: 01/11/25 1925    Specimen: Blood, Venous Updated: 01/11/25 2004     High Sens Troponin I 5.0 pg/mL     Comprehensive metabolic panel [[id number]]  (Abnormal) Collected: 01/11/25 1925    Specimen: Blood,  Venous Updated: 01/11/25 1957     Sodium 137 mEQ/L      Potassium 4.0 mEQ/L      Comment: Results obtained on plasma. Plasma Potassium values may be up to 0.4 mEQ/L less than serum values. The differences may be greater for patients with high platelet or white cell counts.        Chloride 102 mEQ/L      CO2 27 mEQ/L      BUN 14 mg/dL      Creatinine 0.8 mg/dL      Glucose 113 mg/dL      Calcium 9.5 mg/dL      AST (SGOT) 22 IU/L      ALT (SGPT) 24 IU/L      Alkaline Phosphatase 60 IU/L      Total Protein 7.0 g/dL      Comment: Test performed on plasma which typically contains approximately 0.4 g/dL more protein than serum.        Albumin 4.5 g/dL      Bilirubin, Total 0.5 mg/dL      eGFR >60.0 mL/min/1.73m*2      Comment: Calculation based on the Chronic Kidney Disease Epidemiology Collaboration (CKD-EPI) equation refit without adjustment for race.        Anion Gap 8 mEQ/L     CBC and differential [[id number]]  (Abnormal) Collected: 01/11/25 1925    Specimen: Blood, Venous Updated: 01/11/25 1932     WBC 12.16 K/uL      RBC 4.97 M/uL      Hemoglobin 15.0 g/dL      Hematocrit 42.3 %      MCV 85.1 fL      MCH 30.2 pg      MCHC 35.5 g/dL      RDW 11.9 %      Platelets 321 K/uL      MPV 8.9 fL      Differential Type Auto     nRBC 0.0 %      Immature Granulocytes 0.5 %      Neutrophils 49.7 %      Lymphocytes 37.1 %      Monocytes 9.5 %      Eosinophils 2.4 %      Basophils 0.8 %      Immature Granulocytes, Absolute 0.06 K/uL      Neutrophils, Absolute 6.04 K/uL      Lymphocytes, Absolute 4.51 K/uL      Monocytes, Absolute 1.16 K/uL      Eosinophils, Absolute 0.29 K/uL      Basophils, Absolute 0.10 K/uL     [place] Draw Panel [[id number]] Collected: 01/11/25 1925    Specimen: Blood, Venous Updated: 01/11/25 1929    Narrative:      The following orders were created for panel order [place] Draw Panel.  Procedure                               Abnormality         Status                     ---------                                -----------         ------                     MUKESH MULLIGAN[[id number]]                                  In process                   Please view results for these tests on the individual orders.    MUKESH MULLIGAN [[id number]] Collected: 01/11/25 1925    Specimen: Blood, Venous Updated: 01/11/25 1929            Imaging Results              X-RAY CHEST 2 VIEWS (Final result)  Result time 01/11/25 23:38:22      Final result                   Impression:    IMPRESSION: No active disease.                   Narrative:    CLINICAL HISTORY: Chest Pain/Arrhythmia    PRIOR STUDY: Chest x-ray 11/23/2024    TECHNIQUE: /Frontal/lateral chest    COMMENT:  The lungs are clear.  The heart size is normal.  The osseous and soft  tissues are unremarkable.                        Preliminary Interpretation    No obvious infiltrates                                    ECG 12 lead   Independent Interpretation by ED Provider   EKG reviewed by me.  NSR at 90 bpm.  No ischemia.  No STEMI.          Scoring tools                                  ED Course & MDM   MDM / ED COURSE / CLINICAL IMPRESSION / DISPO     Medical Decision Making  Problems Addressed:  Chest pain, unspecified type: acute illness or injury    Amount and/or Complexity of Data Reviewed  Labs: ordered. Decision-making details documented in ED Course.  Radiology: ordered and independent interpretation performed.  ECG/medicine tests: ordered.        ED Course as of 01/12/25 1326   Sat Jan 11, 2025 2117 Impression: Worsening CP x tonight, ongoing auditory hallucinations for years that have increased since his mother passing.    Patient does feel comfortable returning home if medically cleared.     Plan: labs, trop, CXR [EB]   2118 SARS-COV-2 (COVID-19)/ FLU A/B, AND RSV, PCR Nasopharynx  Negative.  [EB]   2118 WBC(!): 12.16 [EB]   2118 Comprehensive metabolic panel(!)  Unremarkable.  [EB]   2118 High Sens Troponin I: 5.0  #1. Will repeat.  [EB]   2154 High Sens Troponin I:  4.9  Improved. Will discharge.  [EB]      ED Course User Index  [EB] Gail Callejas PA C     Clinical Impression      Chest pain, unspecified type     _________________       ED Disposition   Discharge                       Gail Callejas PA C  01/12/25 1320

## 2025-01-12 NOTE — ED ATTESTATION NOTE
Procedures  Physical Exam  Review of Systems  Medical Decision Making        I have personally seen and examined the patient.  I personally performed the key components of the encounter and provided a substantive portion of the care and medical decision making for this patient. I reviewed and agree with the PA/NP/Resident's assessment and plan of care, with any exceptions as documented below    My focused history, examination, assessment, and plan of care of Rodolfo Stafford is as follows:  The history is provided by Patient  The patient is a 44 y.o. who comes to the ED for chest pain, patient has a history of palpitations, chest pain, has been seen in the past by cardiology and underwent prior stress testing which was negative reportedly.  Patient notes having intermittent chest pain including today which was different than his prior so became concerned.  He notes that it is diffuse, denies any specific radiation, no associated shortness of breath, cough or congestion.  Patient denies lightheadedness.  Denies clear exertional symptoms.  Patient does suffer from auditory hallucinations however he denies any thoughts of self-harm or feeling unsafe overall to me as he has been taking his medications well.      Pertinent past medical history includes:    Past Medical History:   Diagnosis Date    Anxiety     Depression     Schizophrenia (CMS/[facility])          Past Surgical History   Procedure Laterality Date    Tonsillectomy      [place] tooth extraction  2012    All 4       Exam: No focal abnormal lung sounds, heart regular rate and rhythm, no reproducible chest discomfort.      Impression/Plan/Medical decision making/ED course: 44-year-old presenting with above, differential diagnosis includes MSK pain, would also consider possibility of cardiac disease although much less likely, certainly could be related to lung disease or infectious etiology although no other significant symptoms.  Patient subsequently worked up with  labs, EKG, chest x-ray, these were reassuring and the patient subsequently discharged with instructions for close follow-up.  She is otherwise to return with any new or worsening symptoms.     After my initial evaluation, the patient requires testing, treatment and/or serial reevaluations to rule in or out the need for admission or emergency surgery.    The patient The patient was discharged after careful consideration for any admission needs    Medications - No data to display      ECG review: ECG read/interpreted by me: As Below   ECG 12 lead   Independent Interpretation by ED Provider   EKG reviewed by me.  NSR at 90 bpm.  No ischemia.  No STEMI.          While here I have   Reviewed previous records in our system:  Reviewed prior outpatient and ER visits  Reviewed care everywhere for outside records: Reviewed prior outpatient and ER visits  Reviewed and interpreted lab results: agree with, independently interpreted, and reviewed below  Labs Reviewed   COMPREHENSIVE METABOLIC PANEL - Abnormal       Result Value    Sodium 137      Potassium 4.0      Chloride 102      CO2 27      BUN 14      Creatinine 0.8      Glucose 113 (*)     Calcium 9.5      AST (SGOT) 22      ALT (SGPT) 24      Alkaline Phosphatase 60      Total Protein 7.0      Albumin 4.5      Bilirubin, Total 0.5      eGFR >60.0      Anion Gap 8     CBC AND DIFF - Abnormal    WBC 12.16 (*)     RBC 4.97      Hemoglobin 15.0      Hematocrit 42.3      MCV 85.1      MCH 30.2      MCHC 35.5      RDW 11.9      Platelets 321      MPV 8.9 (*)     Differential Type Auto      nRBC 0.0      Immature Granulocytes 0.5      Neutrophils 49.7      Lymphocytes 37.1      Monocytes 9.5      Eosinophils 2.4      Basophils 0.8      Immature Granulocytes, Absolute 0.06      Neutrophils, Absolute 6.04      Lymphocytes, Absolute 4.51 (*)     Monocytes, Absolute 1.16 (*)     Eosinophils, Absolute 0.29      Basophils, Absolute 0.10     SARS-COV-2 (COVID-19)/ FLU A/B, AND RSV, PCR  - Normal    SARS-CoV-2 (COVID-19) Negative      Influenza A Negative      Influenza B Negative      Respiratory Syncytial Virus Negative      Narrative:     Testing performed using real-time PCR for detection of COVID-19. EUA approved validation studies performed on site.    HIGH SENSITIVE TROPONIN I (BASELINE - REFLEX 2HR) - Normal    High Sens Troponin I 5.0     HIGH SENSITIVE TROPONIN I (NO REFLEX) - Normal    High Sens Troponin I 4.9     RAINBOW DRAW PANEL    Narrative:     The following orders were created for panel order [place] Draw Panel.  Procedure                               Abnormality         Status                     ---------                               -----------         ------                     "[employer][id number]]                                  In process                   Please view results for these tests on the individual orders.   RAINBOW LT BLUE     Reviewed and interpreted xrays, CT, MRI, US:  agree with, independently interpreted, and reviewed below  X-RAY CHEST 2 VIEWS   ED Interpretation   No obvious infiltrates      Final Result   IMPRESSION: No active disease.            ECG 12 lead   ED Interpretation   EKG reviewed by me.  NSR at 90 bpm.  No ischemia.  No STEMI.      Final Result        Compared lab results to previous lab results: .  Compared image results to previous results: .  Called and spoke with a consultant or physician about this case: N/A    Vital Signs Review: Vital signs have been reviewed. The oxygen saturation is SpO2: SpO2: 97 %   which is Normal      I was physically present for the key/critical portions of the following procedures: none     Marky Aguirre MD  01/12/25 9904

## 2025-01-12 NOTE — ED NOTES
Patient discharged home. Alert and oriented *4; following commands, no cardiac or respiratory distress. All questions answered. Name and  verified at time of discharge.      Josephine Clayton RN  [phone]

## 2025-01-12 NOTE — DISCHARGE INSTRUCTIONS
You were seen in the ER for chest pain. Your blood work was negative for acute signs of infection, anemia, or electrolyte imbalances. Your troponin which is a cardiac biomarker for distress was negative. Your chest xray was negative for acute disease within the chest. Your EKG showed no signs of ischemia.     It is recommended that you follow up with cardiology for further evaluation and management. Please call to make an appointment to be seen within the next 1 week.     You may take Motrin (600 mg) every 6 hours for pain. You may also take Tylenol (500 mg) every 6 hours for pain. You may alternate the two medications every 3 hours for complete pain control.     Please rest and get adequate sleep, approximately 7-8 hours night. Please drink plenty of fluids and stay hydrated.     Please return to the ER for any worsening chest pain, trouble breathing, palpitations, leg pain, leg swelling, back pain, dizziness, lightheadedness, nausea, vomiting, abdominal pain, or any other new or concerning symptoms

## 2025-02-18 ENCOUNTER — TELEPHONE (OUTPATIENT)
Dept: NEUROLOGY | Facility: CLINIC | Age: 45
End: 2025-02-18

## 2025-02-18 NOTE — TELEPHONE ENCOUNTER
Reason for Call: patient works in a refrigeration section and would like to know if the drastic changes of weather will have effects on the patients current dementia  diagnosis     Patient provider: Dr. Doty    Call back : Yes     Action needed to resolve answer question

## 2025-02-25 ENCOUNTER — HOSPITAL ENCOUNTER (OUTPATIENT)
Dept: RADIOLOGY | Facility: HOSPITAL | Age: 45
Discharge: HOME | End: 2025-02-25
Attending: INTERNAL MEDICINE
Payer: COMMERCIAL

## 2025-02-25 DIAGNOSIS — R03.0 ELEVATED BLOOD-PRESSURE READING, WITHOUT DIAGNOSIS OF HYPERTENSION: ICD-10-CM

## 2025-02-25 PROCEDURE — 75571 CT HRT W/O DYE W/CA TEST: CPT

## 2025-08-28 ENCOUNTER — OFFICE VISIT (OUTPATIENT)
Facility: HOSPITAL | Age: 45
End: 2025-08-28
Payer: COMMERCIAL

## 2025-08-28 VITALS — SYSTOLIC BLOOD PRESSURE: 120 MMHG | OXYGEN SATURATION: 97 % | DIASTOLIC BLOOD PRESSURE: 76 MMHG | HEART RATE: 90 BPM

## 2025-08-28 DIAGNOSIS — R41.3 MEMORY LOSS: Primary | ICD-10-CM

## 2025-08-28 PROCEDURE — G0463 HOSPITAL OUTPT CLINIC VISIT: HCPCS | Performed by: PSYCHIATRY & NEUROLOGY

## 2025-08-28 PROCEDURE — 99204 OFFICE O/P NEW MOD 45 MIN: CPT | Performed by: PSYCHIATRY & NEUROLOGY

## 2025-08-28 RX ORDER — LOSARTAN POTASSIUM 25 MG/1
TABLET ORAL
COMMUNITY
Start: 2024-05-30

## 2025-08-28 RX ORDER — LUMATEPERONE 21 MG/1
CAPSULE ORAL
COMMUNITY
Start: 2025-08-22

## 2025-08-28 RX ORDER — FLUTICASONE PROPIONATE 50 MCG
SPRAY, SUSPENSION (ML) NASAL
COMMUNITY
Start: 2025-07-24

## 2025-08-28 RX ORDER — AZELASTINE 1 MG/ML
SPRAY, METERED NASAL
COMMUNITY
Start: 2025-06-12

## 2025-09-02 ENCOUNTER — HOSPITAL ENCOUNTER (OUTPATIENT)
Dept: CARDIOLOGY | Facility: HOSPITAL | Age: 45
Discharge: HOME | End: 2025-09-02
Attending: PSYCHIATRY & NEUROLOGY
Payer: COMMERCIAL

## 2025-09-02 DIAGNOSIS — R41.3 MEMORY LOSS: ICD-10-CM

## 2025-09-02 PROCEDURE — 95816 EEG AWAKE AND DROWSY: CPT | Mod: 26 | Performed by: PSYCHIATRY & NEUROLOGY

## 2025-09-02 PROCEDURE — 95816 EEG AWAKE AND DROWSY: CPT

## 2025-09-05 LAB
25(OH)D3+25(OH)D2 SERPL-MCNC: 26 NG/ML (ref 30–100)
ALBUMIN SERPL-MCNC: 4.6 G/DL (ref 3.6–5.1)
ALBUMIN/GLOB SERPL: 2.3 (CALC) (ref 1–2.5)
ALP SERPL-CCNC: 64 U/L (ref 36–130)
ALT SERPL-CCNC: 22 U/L (ref 9–46)
AST SERPL-CCNC: 20 U/L (ref 10–40)
BASOPHILS # BLD AUTO: 89 CELLS/UL (ref 0–200)
BASOPHILS NFR BLD AUTO: 1 %
BILIRUB SERPL-MCNC: 0.9 MG/DL (ref 0.2–1.2)
BUN SERPL-MCNC: 13 MG/DL (ref 7–25)
BUN/CREAT SERPL: NORMAL (CALC) (ref 6–22)
CALCIUM SERPL-MCNC: 9.3 MG/DL (ref 8.6–10.3)
CHLORIDE SERPL-SCNC: 105 MMOL/L (ref 98–110)
CHOLEST SERPL-MCNC: 161 MG/DL
CHOLEST/HDLC SERPL: 4 (CALC)
CO2 SERPL-SCNC: 27 MMOL/L (ref 20–32)
CREAT SERPL-MCNC: 0.65 MG/DL (ref 0.6–1.29)
EGFRCR SERPLBLD CKD-EPI 2021: 120 ML/MIN/1.73M2
EOSINOPHIL # BLD AUTO: 214 CELLS/UL (ref 15–500)
EOSINOPHIL NFR BLD AUTO: 2.4 %
ERYTHROCYTE [DISTWIDTH] IN BLOOD BY AUTOMATED COUNT: 12.5 % (ref 11–15)
FOLATE SERPL-MCNC: 16.1 NG/ML
GLOBULIN SER CALC-MCNC: 2 G/DL (CALC) (ref 1.9–3.7)
GLUCOSE SERPL-MCNC: 94 MG/DL (ref 65–99)
HCT VFR BLD AUTO: 41.3 % (ref 38.5–50)
HDLC SERPL-MCNC: 40 MG/DL
HGB BLD-MCNC: 13.8 G/DL (ref 13.2–17.1)
LDLC SERPL CALC-MCNC: 99 MG/DL (CALC)
LYMPHOCYTES # BLD AUTO: 2430 CELLS/UL (ref 850–3900)
LYMPHOCYTES NFR BLD AUTO: 27.3 %
MCH RBC QN AUTO: 29.7 PG (ref 27–33)
MCHC RBC AUTO-ENTMCNC: 33.4 G/DL (ref 32–36)
MCV RBC AUTO: 88.8 FL (ref 80–100)
METHYLMALONATE SERPL-SCNC: NORMAL
MONOCYTES # BLD AUTO: 979 CELLS/UL (ref 200–950)
MONOCYTES NFR BLD AUTO: 11 %
NEUTROPHILS # BLD AUTO: 5189 CELLS/UL (ref 1500–7800)
NEUTROPHILS NFR BLD AUTO: 58.3 %
NONHDLC SERPL-MCNC: 121 MG/DL (CALC)
PLATELET # BLD AUTO: 266 THOUSAND/UL (ref 140–400)
PMV BLD REES-ECKER: 9.1 FL (ref 7.5–12.5)
POTASSIUM SERPL-SCNC: 4.2 MMOL/L (ref 3.5–5.3)
PROT SERPL-MCNC: 6.6 G/DL (ref 6.1–8.1)
PSA SERPL-MCNC: 0.3 NG/ML
RBC # BLD AUTO: 4.65 MILLION/UL (ref 4.2–5.8)
RPR SER QL: NORMAL
SODIUM SERPL-SCNC: 138 MMOL/L (ref 135–146)
TRIGL SERPL-MCNC: 120 MG/DL
TSH SERPL-ACNC: 1.05 MIU/L (ref 0.4–4.5)
VIT B12 SERPL-MCNC: 310 PG/ML (ref 200–1100)
WBC # BLD AUTO: 8.9 THOUSAND/UL (ref 3.8–10.8)